# Patient Record
Sex: FEMALE | Race: WHITE | NOT HISPANIC OR LATINO | Employment: STUDENT | URBAN - METROPOLITAN AREA
[De-identification: names, ages, dates, MRNs, and addresses within clinical notes are randomized per-mention and may not be internally consistent; named-entity substitution may affect disease eponyms.]

---

## 2017-02-06 ENCOUNTER — OFFICE VISIT (OUTPATIENT)
Dept: ENDOCRINOLOGY | Facility: CLINIC | Age: 22
End: 2017-02-06

## 2017-02-06 VITALS
DIASTOLIC BLOOD PRESSURE: 72 MMHG | HEART RATE: 118 BPM | BODY MASS INDEX: 20.39 KG/M2 | WEIGHT: 108 LBS | HEIGHT: 61 IN | SYSTOLIC BLOOD PRESSURE: 110 MMHG

## 2017-02-06 DIAGNOSIS — E10.65 POORLY CONTROLLED TYPE 1 DIABETES MELLITUS (H): Primary | ICD-10-CM

## 2017-02-06 LAB — HBA1C MFR BLD: 12.1 % (ref 4.3–6)

## 2017-02-06 ASSESSMENT — PAIN SCALES - GENERAL: PAINLEVEL: NO PAIN (0)

## 2017-02-06 NOTE — PATIENT INSTRUCTIONS
Work on taking insulin every time that you eat.  Don't worry about testing when you eat- just cover the food.     Try these suggested doses for a few common foods...  Macaroni and cheese- 6-8 units  Hot pocket- 2 units  Cereal- 4 units  Popcorn- 2 units  Banana- 2 units  Grapes- 2 units    Test if you feel low.     A1c down from 13.1 to 12.1%!  Keep up the great work!    Aiyana   [phone]

## 2017-02-06 NOTE — Clinical Note
2/6/2017       RE: Ashley Gonzales  [address] [facility] [place] [address]     Dear Colleague,    Thank you for referring your patient, Ashley Gonzales, to the [facility] ENDOCRINOLOGY at [facility]. Please see a copy of my visit note below.    HPI: Ashley is a 22 yo woman here with her mom for follow up of type 1 diabetes, diagnosed at age 2.  She has struggled for many years with high blood sugars, but has recently been motivated to improve her control.  She struggles with her diabetes and the associated gastroparesis she has developed, although lately this has been somewhat better. She has gained a few pounds. She is followed by Dr. Leon here in GI.     Ashley's schedule is to go to sleep around 2 am and wake around 1-2 pm.  She usually eats her first meal around 2.  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  She eats throughout the day and does not usually cover with a bolus.  She has big struggles with the judgement she feels when she has high blood sugars.  She has never had severe hypoglycemia, but worries a little about it.  She feels when her blood sugars drop.  She wore a sensor in the past, but did not like being hooked to two devices.  She is actually quite interested in getting a dexcom sensor to help her with her management.     Ashley is currently testing her blood sugar once a day upon waking.  Over the past month, her overall average was 283 mg/dL (range 135-432).  She boluses about twice a day.  She started changing her infusion set more often.     Ashley wears a Medtronic paradigm revel pump with basal rates as follows:     Mid- 0.625  Noon- 0.7    IC ratio- 1:15g (although she does not usually count carbs)  Sensitivity- 50  Target glucose-   Active insulin time- 4 hours    Average total daily insulin dose- 28 Units ( 58%basal, 42%bolus)      ROS  GENERAL: weight stable.  No fevers,  chills, malaise, night sweats.   HEENT: no dysphagia, diplopia, neck pain or tenderness, dry/scratchy eyes, URI, cough, sinus drainage, tinnitus, sinus pressure  CV: no chest pain, pressure.  +palpitations occasionally.  Feels anxious.   LUNGS: no SOB, CARRASCO, cough, sputum production, wheezing   GI: no diarrhea, constipation, abdominal pain  EXTREMITIES: no rashes, ulcers, edema  NEUROLOGY: no changes in vision, tingling or numbness in hands or feet.   MSK: no muscle aches or pains, weakness  PSYCH: feels anxious, particularly when having to deal with her diabetes.    Past Medical History   Diagnosis Date     Diabetes (H)      Diagnosed at 18 months old, currently with insulin pump       No past surgical history on file.    Family History   Problem Relation Age of Onset     DIABETES Mother      Hyperlipidemia Mother      MENTAL ILLNESS Mother      DIABETES Father      MENTAL ILLNESS Father      DIABETES Maternal Grandfather      Hypertension Maternal Grandfather      Hyperlipidemia Maternal Grandfather      Breast Cancer Paternal Grandmother      CEREBROVASCULAR DISEASE Paternal Grandfather      Family History:  Mother-Type 2 DM  Father-Type 2 DM  Great grand aunt-DM type 2  Uncles with Type 2 DM   No thyroid disease or cancers.     Social History     Social History     Marital Status: Single     Spouse Name: N/A     Number of Children: N/A     Years of Education: N/A     Social History Main Topics     Smoking status: Never Smoker      Smokeless tobacco: None     Alcohol Use: No     Drug Use: No     Sexual Activity: Not Asked     Other Topics Concern     None     Social History Narrative   Social Hx: Ashley is single, lives at home with her parents and older brother.  She is very interested in photography and was enrolled at the [employer], but stopped going to classes recently due to her health issues.  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       Current  "Outpatient Prescriptions   Medication     blood glucose monitoring (MILA CONTOUR NEXT) test strip     Multiple vitamin  s TABS     blood glucose monitoring (NO BRAND SPECIFIED) test strip     insulin aspart (NOVOLOG) 100 UNITS/ML VIAL     ketone blood test STRP     Insulin Pump Accessories (SNAP INSULIN PUMP CONTROLLER) MISC     glucagon 1 MG injection     No current facility-administered medications for this visit.          Allergies   Allergen Reactions     No Clinical Screening - See Comments Hives and Rash     straw       Physical Exam  /72 mmHg  Pulse 118  Ht 1.549 m (5' 1\")  Wt 48.988 kg (108 lb)  BMI 20.42 kg/m2  GENERAL:  Alert and oriented X3, NAD, well dressed, answering questions appropriately, appears stated age.  EXTREMITIES: no edema, +pulses, no rashes, no lesions    RESULTS    Lab Results   Component Value Date    A1C 12.2* 01/11/2016       TSH   Date Value Ref Range Status   10/31/2016 1.63 0.40 - 4.00 mU/L Final       CREATININE   Date Value Ref Range Status   10/31/2016 0.63 0.50 - 1.00 mg/dL Final       No results found for: POTASSIUM    No results found for: MICROALBUMIN    No results found for: ALT    No results for input(s): CHOL, HDL, LDL, TRIG, CHOLHDLRATIO in the last 14864 hours.        Assessment/Plan:     1.  Type 1 diabetes-  Overall, control is improving.  A1c is down from 13.1% to 12.1%.  We discussed making small and attainable goals.  She avoids testing mainly because she does not want to see that she is high and she has significant anxiety around this.  We talked about just covering her food, even if she does not test.  We made a list of common foods she eats and I suggested boluses.  She feels she would be able to bolus 4-5 times a day by doing this, as it takes away the judgement.  She feels this is an attainable goal.  Her current goal is to get her a1c down to 10%, which I think is a very reasonable goal to start.  Will work together slowly.  I will set her up to see " Danielle Ruiz to order a Dexcom CGM. She does realize she will need to test twice daily to calibrate this, but with bolusing more regularly, she shouldn't be too high. I anticipate we will need to lower her basal rates as she starts bolusing more regularly. Much encouragement given for coming back to see me, for testing a bit more and for being willing to make some changes. Instructions given to patient:   Work on taking insulin every time that you eat.  Don't worry about testing when you eat- just cover the food.     Try these suggested doses for a few common foods...  Macaroni and cheese (full box)- 6-8 units  Hot pocket- 2 units  Cereal- 4 units  Popcorn- 2 units  Banana- 2 units  Grapes- 2 units    Test if you feel low.     A1c down from 13.1 to 12.1%!  Keep up the great work!      2.  Risk factors-     Retinopathy:  No.  Had eye exam within last year.   Nephropathy:  BP well controlled. Heart rate a bit high.  TSH ok.  Will also check MA and creatinine.  Neuropathy: No.    Feet: OK, no ulcers.   Lipids: Need labs    3.  F/U in 4-6 weeks with me, in 6 months to establish care with Dr. Nadia Stern, sooner with concerns or hypoglycemia.     25/30 minute visit was spent counseling patient.     Aiyana Melendez PA-C, MPAS   [facility]  Department of Medicine  Division of Endocrinology and Diabetes

## 2017-02-06 NOTE — NURSING NOTE
Chief Complaint   Patient presents with     RECHECK     F/U TYPE I DM     Clary Cespedes, CMA  Endocrinology & Diabetes 3G    Capillary Fingerstick performed for an Hemoglobin A1C test

## 2017-02-06 NOTE — MR AVS SNAPSHOT
After Visit Summary   2/6/2017    Ashley Gonzales    MRN: [id number]           Patient Information     Date Of Birth          1995        Visit Information        Provider Department      2/6/2017 1:30 PM Aiyana Melendez PA-C [facility] Endocrinology        Today's Diagnoses     Poorly controlled type 1 diabetes mellitus (H)    -  1       Care Instructions    Work on taking insulin every time that you eat.  Don't worry about testing when you eat- just cover the food.     Try these suggested doses for a few common foods...  Macaroni and cheese- 6-8 units  Hot pocket- 2 units  Cereal- 4 units  Popcorn- 2 units  Banana- 2 units  Grapes- 2 units    Test if you feel low.     A1c down from 13.1 to 12.1%!  Keep up the great work!    Aiyana   [phone]        Follow-ups after your visit        Additional Services     DIABETES EDUCATOR REFERRAL       Schedule in diabetes education to order sensor.                  Follow-up notes from your care team     Return in about 6 weeks (around 3/20/2017).      Your next 10 appointments already scheduled     Mar 27, 2017  2:00 PM   (Arrive by 1:45 PM)   RETURN DIABETES with JOSE ANTONIO Seaman [facility] Endocrinology ([facility])    [address] [place] [address] [id number] [phone]            Mar 27, 2017  2:30 PM   (Arrive by 2:15 PM)   Office Visit with Danielle Ruiz RN   [facility] Diabetes ([facility])    [address] [place] [address] [id number] [phone]           Bring a current list of meds and any records pertaining to this visit.  For Physicals, please bring immunization records and any forms needing to be filled out.  Please arrive 10 minutes early to complete paperwork.            May 10, 2017  3:40 PM   (Arrive by 3:25 PM)   Return Visit with Patricia Beasley MD   [facility] Gastroenterology and IBD ([facility])    [facility]  "[address] [id number] [phone]              Who to contact     Please call your clinic at [phone] to:    Ask questions about your health    Make or cancel appointments    Discuss your medicines    Learn about your test results    Speak to your doctor   If you have compliments or concerns about an experience at your clinic, or if you wish to file a complaint, please contact [facility] Patient Relations at [phone] or email us at [email or web address]         Additional Information About Your Visit        [employer] Information     [employer] gives you secure access to your electronic health record. If you see a primary care provider, you can also send messages to your care team and make appointments. If you have questions, please call your primary care clinic.  If you do not have a primary care provider, please call [phone] and they will assist you.      [employer] is an electronic gateway that provides easy, online access to your medical records. With [employer], you can request a clinic appointment, read your test results, renew a prescription or communicate with your care team.     To access your existing account, please contact your [facility] Clinic or call [phone] for assistance.        Care EveryWhere ID     This is your Care EveryWhere ID. This could be used by other organizations to access your [place] medical records  [id number]        Your Vitals Were     Pulse Height BMI (Body Mass Index)             118 1.549 m (5' 1\") 20.42 kg/m2          Blood Pressure from Last 3 Encounters:   02/06/17 110/72   10/31/16 111/72   09/19/16 116/78    Weight from Last 3 Encounters:   02/06/17 48.988 kg (108 lb)   10/31/16 49.351 kg (108 lb 12.8 oz) (13.00 %*)   09/19/16 48.036 kg (105 lb 14.4 oz) (9.09 %*)     * Growth percentiles are based on CDC 2-20 Years data.              We Performed the Following     " DIABETES EDUCATOR REFERRAL        Primary Care Provider Office Phone # Fax #    Mamadou Martinez [phone]       [facility] [address]  [facility] [phone]        Thank you!     Thank you for choosing [facility]  for your care. Our goal is always to provide you with excellent care. Hearing back from our patients is one way we can continue to improve our services. Please take a few minutes to complete the written survey that you may receive in the mail after your visit with us. Thank you!             Your Updated Medication List - Protect others around you: Learn how to safely use, store and throw away your medicines at www.disposemymeds.org.          This list is accurate as of: 2/6/17  2:35 PM.  Always use your most recent med list.                   Brand Name Dispense Instructions for use    * blood glucose monitoring test strip    no brand specified     TEST 6-8 TIMES DAILY       * blood glucose monitoring test strip    MILA CONTOUR NEXT    200 each    by In Vitro route 2 times daily Use to test blood sugar 2 times daily or as directed.       glucagon 1 MG kit      INJECT SUBCUTANEOUSLY AS DIRECTED AS NEEDED       insulin aspart 100 UNITS/ML injection    NovoLOG         ketone blood test Strp      As directed.       Multiple vitamin  s Tabs      Take 1 tablet by mouth       SNAP INSULIN PUMP CONTROLLER Misc      As directed. Novalog:  Gives a base rate, boluses according to 1-3 times/day       * Notice:  This list has 2 medication(s) that are the same as other medications prescribed for you. Read the directions carefully, and ask your doctor or other care provider to review them with you.

## 2017-02-06 NOTE — PROGRESS NOTES
HPI: Ashley is a 20 yo woman here with her mom for follow up of type 1 diabetes, diagnosed at age 2.  She has struggled for many years with high blood sugars, but has recently been motivated to improve her control.  She struggles with her diabetes and the associated gastroparesis she has developed, although lately this has been somewhat better. She has gained a few pounds. She is followed by Dr. Leon here in GI.     Ashley's schedule is to go to sleep around 2 am and wake around 1-2 pm.  She usually eats her first meal around 2.  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  She eats throughout the day and does not usually cover with a bolus.  She has big struggles with the judgement she feels when she has high blood sugars.  She has never had severe hypoglycemia, but worries a little about it.  She feels when her blood sugars drop.  She wore a sensor in the past, but did not like being hooked to two devices.  She is actually quite interested in getting a dexcom sensor to help her with her management.     Ashley is currently testing her blood sugar once a day upon waking.  Over the past month, her overall average was 283 mg/dL (range 135-432).  She boluses about twice a day.  She started changing her infusion set more often.     Ashley wears a Medtronic paradigm revel pump with basal rates as follows:     Mid- 0.625  Noon- 0.7    IC ratio- 1:15g (although she does not usually count carbs)  Sensitivity- 50  Target glucose-   Active insulin time- 4 hours    Average total daily insulin dose- 28 Units ( 58%basal, 42%bolus)      ROS  GENERAL: weight stable.  No fevers, chills, malaise, night sweats.   HEENT: no dysphagia, diplopia, neck pain or tenderness, dry/scratchy eyes, URI, cough, sinus drainage, tinnitus, sinus pressure  CV: no chest pain, pressure.  +palpitations occasionally.  Feels anxious.   LUNGS: no SOB, CARRASCO, cough, sputum  production, wheezing   GI: no diarrhea, constipation, abdominal pain  EXTREMITIES: no rashes, ulcers, edema  NEUROLOGY: no changes in vision, tingling or numbness in hands or feet.   MSK: no muscle aches or pains, weakness  PSYCH: feels anxious, particularly when having to deal with her diabetes.    Past Medical History   Diagnosis Date     Diabetes (H)      Diagnosed at 18 months old, currently with insulin pump       No past surgical history on file.    Family History   Problem Relation Age of Onset     DIABETES Mother      Hyperlipidemia Mother      MENTAL ILLNESS Mother      DIABETES Father      MENTAL ILLNESS Father      DIABETES Maternal Grandfather      Hypertension Maternal Grandfather      Hyperlipidemia Maternal Grandfather      Breast Cancer Paternal Grandmother      CEREBROVASCULAR DISEASE Paternal Grandfather      Family History:  Mother-Type 2 DM  Father-Type 2 DM  Great grand aunt-DM type 2  Uncles with Type 2 DM   No thyroid disease or cancers.     Social History     Social History     Marital Status: Single     Spouse Name: N/A     Number of Children: N/A     Years of Education: N/A     Social History Main Topics     Smoking status: Never Smoker      Smokeless tobacco: None     Alcohol Use: No     Drug Use: No     Sexual Activity: Not Asked     Other Topics Concern     None     Social History Narrative   Social Hx: Ashley is single, lives at home with her parents and older brother.  She is very interested in photography and was enrolled at the [employer], but stopped going to classes recently due to her health issues.  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       Current Outpatient Prescriptions   Medication     blood glucose monitoring (MILA CONTOUR NEXT) test strip     Multiple vitamin  s TABS     blood glucose monitoring (NO BRAND SPECIFIED) test strip     insulin aspart (NOVOLOG) 100 UNITS/ML VIAL     ketone blood test STRP      "Insulin Pump Accessories (SNAP INSULIN PUMP CONTROLLER) MISC     glucagon 1 MG injection     No current facility-administered medications for this visit.          Allergies   Allergen Reactions     No Clinical Screening - See Comments Hives and Rash     straw       Physical Exam  /72 mmHg  Pulse 118  Ht 1.549 m (5' 1\")  Wt 48.988 kg (108 lb)  BMI 20.42 kg/m2  GENERAL:  Alert and oriented X3, NAD, well dressed, answering questions appropriately, appears stated age.  EXTREMITIES: no edema, +pulses, no rashes, no lesions    RESULTS    Lab Results   Component Value Date    A1C 12.2* 01/11/2016       TSH   Date Value Ref Range Status   10/31/2016 1.63 0.40 - 4.00 mU/L Final       CREATININE   Date Value Ref Range Status   10/31/2016 0.63 0.50 - 1.00 mg/dL Final       No results found for: POTASSIUM    No results found for: MICROALBUMIN    No results found for: ALT    No results for input(s): CHOL, HDL, LDL, TRIG, CHOLHDLRATIO in the last 20932 hours.        Assessment/Plan:     1.  Type 1 diabetes-  Overall, control is improving.  A1c is down from 13.1% to 12.1%.  We discussed making small and attainable goals.  She avoids testing mainly because she does not want to see that she is high and she has significant anxiety around this.  We talked about just covering her food, even if she does not test.  We made a list of common foods she eats and I suggested boluses.  She feels she would be able to bolus 4-5 times a day by doing this, as it takes away the judgement.  She feels this is an attainable goal.  Her current goal is to get her a1c down to 10%, which I think is a very reasonable goal to start.  Will work together slowly.  I will set her up to see Danielle Petersrow to order a Dexcom CGM. She does realize she will need to test twice daily to calibrate this, but with bolusing more regularly, she shouldn't be too high. I anticipate we will need to lower her basal rates as she starts bolusing more regularly. Much " encouragement given for coming back to see me, for testing a bit more and for being willing to make some changes. Instructions given to patient:   Work on taking insulin every time that you eat.  Don't worry about testing when you eat- just cover the food.     Try these suggested doses for a few common foods...  Macaroni and cheese (full box)- 6-8 units  Hot pocket- 2 units  Cereal- 4 units  Popcorn- 2 units  Banana- 2 units  Grapes- 2 units    Test if you feel low.     A1c down from 13.1 to 12.1%!  Keep up the great work!      2.  Risk factors-     Retinopathy:  No.  Had eye exam within last year.   Nephropathy:  BP well controlled. Heart rate a bit high.  TSH ok.  Will also check MA and creatinine.  Neuropathy: No.    Feet: OK, no ulcers.   Lipids: Need labs    3.  F/U in 4-6 weeks with me, in 6 months to establish care with Dr. Nadia Stern, sooner with concerns or hypoglycemia.     25/30 minute visit was spent counseling patient.     Aiyana Melendez PA-C, MPAS   [facility]  Department of Medicine  Division of Endocrinology and Diabetes

## 2017-03-27 ENCOUNTER — OFFICE VISIT (OUTPATIENT)
Dept: EDUCATION SERVICES | Facility: CLINIC | Age: 22
End: 2017-03-27

## 2017-03-27 ENCOUNTER — OFFICE VISIT (OUTPATIENT)
Dept: ENDOCRINOLOGY | Facility: CLINIC | Age: 22
End: 2017-03-27

## 2017-03-27 VITALS
SYSTOLIC BLOOD PRESSURE: 118 MMHG | HEART RATE: 114 BPM | DIASTOLIC BLOOD PRESSURE: 75 MMHG | BODY MASS INDEX: 20.79 KG/M2 | HEIGHT: 61 IN | WEIGHT: 110.1 LBS

## 2017-03-27 DIAGNOSIS — E10.65 POORLY CONTROLLED TYPE 1 DIABETES MELLITUS (H): Primary | ICD-10-CM

## 2017-03-27 DIAGNOSIS — E10.9 DIABETES MELLITUS TYPE 1 (H): Primary | ICD-10-CM

## 2017-03-27 ASSESSMENT — PAIN SCALES - GENERAL: PAINLEVEL: NO PAIN (0)

## 2017-03-27 NOTE — MR AVS SNAPSHOT
"              After Visit Summary   3/27/2017    Ashley Gonzales    MRN: [id number]           Patient Information     Date Of Birth          1995        Visit Information        Provider Department      3/27/2017 2:30 PM Danielle Ruiz RN M [facility] Diabetes        Care Instructions    INSTRUCTIONS FOR WEARING THE DEXCOM CONTINUOUS GLUCOSE MONITOR (CGM):      1. After 2-3 hours, will be prompted to input 2 blood sugar readings - take two different samples (from different fingers) one right after the other, and enter in the ENTER BG menu on the .  This is called calibrating the CGM.      2. Take at least 1 blood sugar reading every 12 hours to calibrate the .  Remember that you should NEVER base a decision to correct a high BG or treat a low BG on the CGM reading.  Always check a blood glucose (BG) reading first.     3.  Don't calibrate the  if you see up or down arrows.  It can make subsequent readings inaccurate.  Wait to ENTER BG until it is >70 and < 300.     4.  It's a good idea to calibrate the CGM before you go to bed so you are not awakened in the middle of the night to do this.     5. Record what you eat at meals and snacks with portion eaten. Record amount of carbohydrate grams in the DexCom  when you eat to help track response to food intake. Record exercise type and time. Record medications you take and the time they are taken.     6. When monitor reads that \"Sensor needs to be Changed,\" go into menu function and hit \"STOP\" (NOT shutdown)    5. If battery power is low (will see indicator on monitor), plug the  in to be charged.  It takes about an hour to fully charge.     7. Avoid taking Tylenol while wearing the DexCom, as it can cause inaccurate glucose readings for about 6 hours you take it.     7. Return all equipment and any food/exercise/medication logs [facility] first [facility] on April 4.  Put everything in the envelope provided " and give to one of the concierges on the first or third floor.     8. Follow-up appointment for review of sensor reports scheduled.          Follow-ups after your visit        Your next 10 appointments already scheduled     May 10, 2017  3:40 PM CDT   (Arrive by 3:25 PM)   Return Visit with Patricia Beasley MD   [facility] IBD ([facility] [place])    [address]  4th Floor  [facility] [address] [phone]              Who to contact     Please call your clinic at [phone] to:    Ask questions about your health    Make or cancel appointments    Discuss your medicines    Learn about your test results    Speak to your doctor   If you have compliments or concerns about an experience at your clinic, or if you wish to file a complaint, please contact [facility] Patient Relations at [phone] or email us at [email or web address]         Additional Information About Your Visit        Sykio[employer] Online Information     Fwd: [employer] gives you secure access to your electronic health record. If you see a primary care provider, you can also send messages to your care team and make appointments. If you have questions, please call your primary care clinic.  If you do not have a primary care provider, please call [phone] and they will assist you.      Jackrabbit is an electronic gateway that provides easy, online access to your medical records. With Jackrabbit, you can request a clinic appointment, read your test results, renew a prescription or communicate with your care team.     To access your existing account, please contact your [facility] or call [phone] for assistance.        Care EveryWhere ID     This is your Care EveryWhere ID. This could be used by other organizations to access your [place] medical records  [id number]         Blood Pressure from Last 3 Encounters:   03/27/17 118/75    02/06/17 110/72   10/31/16 111/72    Weight from Last 3 Encounters:   03/27/17 49.9 kg (110 lb 1.6 oz)   02/06/17 49 kg (108 lb)   10/31/16 49.4 kg (108 lb 12.8 oz) (13 %)*     * Growth percentiles are based on [facility] 2-20 Years data.              Today, you had the following     No orders found for display       Primary Care Provider Office Phone # Fax #    Mamadou Martinez [phone]       [facility] [address]  [facility] [address]        Thank you!     Thank you for choosing [facility] DIABETES  for your care. Our goal is always to provide you with excellent care. Hearing back from our patients is one way we can continue to improve our services. Please take a few minutes to complete the written survey that you may receive in the mail after your visit with us. Thank you!             Your Updated Medication List - Protect others around you: Learn how to safely use, store and throw away your medicines at www.disposemymeds.org.          This list is accurate as of: 3/27/17  3:29 PM.  Always use your most recent med list.                   Brand Name Dispense Instructions for use    * blood glucose monitoring test strip    no brand specified     TEST 6-8 TIMES DAILY       * blood glucose monitoring test strip    MILA CONTOUR NEXT    200 each    by In Vitro route 2 times daily Use to test blood sugar 2 times daily or as directed.       glucagon 1 MG kit      INJECT SUBCUTANEOUSLY AS DIRECTED AS NEEDED       insulin aspart 100 UNITS/ML injection    NovoLOG         ketone blood test Strp      As directed.       Multiple vitamin  s Tabs      Take 1 tablet by mouth       SNAP INSULIN PUMP CONTROLLER Misc      As directed. Novalog:  Gives a base rate, boluses according to 1-3 times/day       * Notice:  This list has 2 medication(s) that are the same as other medications prescribed for you. Read the directions carefully, and ask your doctor or other care provider to review them with  you.

## 2017-03-27 NOTE — PATIENT INSTRUCTIONS
Focus on covering food whenever you eat (even if you do not test!).      Wear diagnostic sensor x 1 week    Keep up the great work!!

## 2017-03-27 NOTE — LETTER
3/27/2017     RE: Ashley Gonzales  [address] [facility] [address]     Dear Colleague,    Thank you for referring your patient, Ashley Gonzales, to the [facility] ENDOCRINOLOGY at [facility]. Please see a copy of my visit note below.    HPI:   Ashley is a 22 yo woman here with her mom for follow up of type 1 diabetes, diagnosed at age 2.  This has been poorly controlled for many years.  She is very excited to get the Dexcom sensor ordered and has been working hard at testing twice daily to do so.  Ashley is currently testing her blood sugar twice a day upon waking and after returning from gymnastics.  Over the past month, her overall average was 283 mg/dL (range ).  She boluses about 2-3 times a day.  She started changing her infusion set more often.     Ashley wears a Medtronic paradigm revel pump with basal rates as follows:     Mid- 0.625  Noon- 0.7    IC ratio- 1:15g (although she does not usually count carbs)  Sensitivity- 50  Target glucose-   Active insulin time- 4 hours    Average total daily insulin dose- 30 Units ( 54%basal, 46%bolus)      ROS  GENERAL: weight stable.  No fevers, chills, malaise, night sweats.   HEENT: no dysphagia, diplopia, neck pain or tenderness, dry/scratchy eyes, URI, cough, sinus drainage, tinnitus, sinus pressure  CV: no chest pain, pressure.  +palpitations occasionally.  Feels anxious.   LUNGS: no SOB, CARRASCO, cough, sputum production, wheezing   GI: no diarrhea, constipation, abdominal pain  EXTREMITIES: no rashes, ulcers, edema  NEUROLOGY: no changes in vision, tingling or numbness in hands or feet.   MSK: no muscle aches or pains, weakness  PSYCH: anxiety a bit better    Past Medical History:   Diagnosis Date     Diabetes (H)     Diagnosed at 18 months old, currently with insulin pump       No past surgical history on file.    Family History   Problem Relation Age of Onset     DIABETES Mother      Hyperlipidemia Mother   "    MENTAL ILLNESS Mother      DIABETES Father      MENTAL ILLNESS Father      DIABETES Maternal Grandfather      Hypertension Maternal Grandfather      Hyperlipidemia Maternal Grandfather      Breast Cancer Paternal Grandmother      CEREBROVASCULAR DISEASE Paternal Grandfather      Family History:  Mother-Type 2 DM  Father-Type 2 DM  Great grand aunt-DM type 2  Uncles with Type 2 DM   No thyroid disease or cancers.     Social History     Social History     Marital Status: Single     Spouse Name: N/A     Number of Children: N/A     Years of Education: N/A     Social History Main Topics     Smoking status: Never Smoker      Smokeless tobacco: None     Alcohol Use: No     Drug Use: No     Sexual Activity: Not Asked     Other Topics Concern     None     Social History Narrative   Social Hx: Ashley is single, lives at home with her parents and older brother.  She is very interested in photography and was enrolled at the [employer], but stopped going to classes recently due to her health issues.  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       Current Outpatient Prescriptions   Medication     blood glucose monitoring (MILA CONTOUR NEXT) test strip     Multiple vitamin  s TABS     blood glucose monitoring (NO BRAND SPECIFIED) test strip     insulin aspart (NOVOLOG) 100 UNITS/ML VIAL     ketone blood test STRP     Insulin Pump Accessories (SNAP INSULIN PUMP CONTROLLER) MISC     glucagon 1 MG injection     No current facility-administered medications for this visit.           Allergies   Allergen Reactions     No Clinical Screening - See Comments Hives and Rash     straw       Physical Exam  /75  Pulse 114  Ht 1.549 m (5' 1\")  Wt 49.9 kg (110 lb 1.6 oz)  BMI 20.8 kg/m2  GENERAL:  Alert and oriented X3, NAD, well dressed, answering questions appropriately, appears stated age.  EXTREMITIES: no edema, +pulses, no rashes, no lesions    RESULTS    Lab Results "   Component Value Date    A1C 12.2 (A) 01/11/2016       TSH   Date Value Ref Range Status   10/31/2016 1.63 0.40 - 4.00 mU/L Final       Creatinine   Date Value Ref Range Status   10/31/2016 0.63 0.50 - 1.00 mg/dL Final     No results found for: POTASSIUM    No results found for: MICROALBUMIN    No results found for: ALT    No results for input(s): CHOL, HDL, LDL, TRIG, CHOLHDLRATIO in the last 22653 hours.    Assessment/Plan:     1.  Type 1 diabetes-  Overall, control is improving.  She will focus again on just covering the food she eats, even if she is not testing.  She will set up a diagnostic sensor today and she will be ordering one today.  Much encouragement given for coming back to see me, for testing a bit more and for being willing to make some changes.     Will increase overnight basal rate as follows:   Mid- 0.675 (up from 0.625)  Noon- 0.7 (no change)    Anticipate we may need to tighten IC ratio in the future, but for now, the main focus is just covering whatever she eats.      2.  Risk factors-     Retinopathy:  No.  Had eye exam within last year.   Nephropathy:  BP well controlled. Heart rate a bit high.  TSH ok.  Will also check MA and creatinine.  Neuropathy: No.    Feet: OK, no ulcers.   Lipids: Need labs    3.  F/U in 4-6 weeks with me, in 6 months to establish care with Dr. Nadia Stern, sooner with concerns or hypoglycemia.     25/30 minute visit was spent counseling patient.     Aiyana Melendez PA-C, MPAS   [facility]  Department of Medicine  Division of Endocrinology and Diabetes

## 2017-03-27 NOTE — PROGRESS NOTES
HPI:   Ashley is a 20 yo woman here with her mom for follow up of type 1 diabetes, diagnosed at age 2.  This has been poorly controlled for many years.  She is very excited to get the Dexcom sensor ordered and has been working hard at testing twice daily to do so.  Ashley is currently testing her blood sugar twice a day upon waking and after returning from gymnastics.  Over the past month, her overall average was 283 mg/dL (range ).  She boluses about 2-3 times a day.  She started changing her infusion set more often.     Ashley wears a Medtronic paradigm revel pump with basal rates as follows:     Mid- 0.625  Noon- 0.7    IC ratio- 1:15g (although she does not usually count carbs)  Sensitivity- 50  Target glucose-   Active insulin time- 4 hours    Average total daily insulin dose- 30 Units ( 54%basal, 46%bolus)      ROS  GENERAL: weight stable.  No fevers, chills, malaise, night sweats.   HEENT: no dysphagia, diplopia, neck pain or tenderness, dry/scratchy eyes, URI, cough, sinus drainage, tinnitus, sinus pressure  CV: no chest pain, pressure.  +palpitations occasionally.  Feels anxious.   LUNGS: no SOB, CARRASCO, cough, sputum production, wheezing   GI: no diarrhea, constipation, abdominal pain  EXTREMITIES: no rashes, ulcers, edema  NEUROLOGY: no changes in vision, tingling or numbness in hands or feet.   MSK: no muscle aches or pains, weakness  PSYCH: anxiety a bit better    Past Medical History:   Diagnosis Date     Diabetes (H)     Diagnosed at 18 months old, currently with insulin pump       No past surgical history on file.    Family History   Problem Relation Age of Onset     DIABETES Mother      Hyperlipidemia Mother      MENTAL ILLNESS Mother      DIABETES Father      MENTAL ILLNESS Father      DIABETES Maternal Grandfather      Hypertension Maternal Grandfather      Hyperlipidemia Maternal Grandfather      Breast Cancer Paternal Grandmother      CEREBROVASCULAR DISEASE Paternal  "Grandfather      Family History:  Mother-Type 2 DM  Father-Type 2 DM  Great grand aunt-DM type 2  Uncles with Type 2 DM   No thyroid disease or cancers.     Social History     Social History     Marital Status: Single     Spouse Name: N/A     Number of Children: N/A     Years of Education: N/A     Social History Main Topics     Smoking status: Never Smoker      Smokeless tobacco: None     Alcohol Use: No     Drug Use: No     Sexual Activity: Not Asked     Other Topics Concern     None     Social History Narrative   Social Hx: Ashley is single, lives at home with her parents and older brother.  She is very interested in photography and was enrolled at the [employer], but stopped going to classes recently due to her health issues.  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       Current Outpatient Prescriptions   Medication     blood glucose monitoring (MILA CONTOUR NEXT) test strip     Multiple vitamin  s TABS     blood glucose monitoring (NO BRAND SPECIFIED) test strip     insulin aspart (NOVOLOG) 100 UNITS/ML VIAL     ketone blood test STRP     Insulin Pump Accessories (SNAP INSULIN PUMP CONTROLLER) MISC     glucagon 1 MG injection     No current facility-administered medications for this visit.           Allergies   Allergen Reactions     No Clinical Screening - See Comments Hives and Rash     straw       Physical Exam  /75  Pulse 114  Ht 1.549 m (5' 1\")  Wt 49.9 kg (110 lb 1.6 oz)  BMI 20.8 kg/m2  GENERAL:  Alert and oriented X3, NAD, well dressed, answering questions appropriately, appears stated age.  EXTREMITIES: no edema, +pulses, no rashes, no lesions    RESULTS    Lab Results   Component Value Date    A1C 12.2 (A) 01/11/2016       TSH   Date Value Ref Range Status   10/31/2016 1.63 0.40 - 4.00 mU/L Final       Creatinine   Date Value Ref Range Status   10/31/2016 0.63 0.50 - 1.00 mg/dL Final       No results found for: POTASSIUM    No " results found for: MICROALBUMIN    No results found for: ALT    No results for input(s): CHOL, HDL, LDL, TRIG, CHOLHDLRATIO in the last 56560 hours.    Assessment/Plan:     1.  Type 1 diabetes-  Overall, control is improving.  She will focus again on just covering the food she eats, even if she is not testing.  She will set up a diagnostic sensor today and she will be ordering one today.  Much encouragement given for coming back to see me, for testing a bit more and for being willing to make some changes.     Will increase overnight basal rate as follows:   Mid- 0.675 (up from 0.625)  Noon- 0.7 (no change)    Anticipate we may need to tighten IC ratio in the future, but for now, the main focus is just covering whatever she eats.      2.  Risk factors-     Retinopathy:  No.  Had eye exam within last year.   Nephropathy:  BP well controlled. Heart rate a bit high.  TSH ok.  Will also check MA and creatinine.  Neuropathy: No.    Feet: OK, no ulcers.   Lipids: Need labs    3.  F/U in 4-6 weeks with me, in 6 months to establish care with Dr. Nadia Stern, sooner with concerns or hypoglycemia.     25/30 minute visit was spent counseling patient.     Aiyana Melendez PA-C, MPAS   [facility]  Department of Medicine  Division of Endocrinology and Diabetes

## 2017-03-27 NOTE — MR AVS SNAPSHOT
After Visit Summary   3/27/2017    Ashley Gonzales    MRN: [id number]           Patient Information     Date Of Birth          1995        Visit Information        Provider Department      3/27/2017 2:00 PM Aiyana Melendez PA-C [facility] Endocrinology        Today's Diagnoses     Poorly controlled type 1 diabetes mellitus (H)    -  1      Care Instructions    Focus on covering food whenever you eat (even if you do not test!).      Wear diagnostic sensor x 1 week    Keep up the great work!!            Follow-ups after your visit        Your next 10 appointments already scheduled     May 10, 2017  3:40 PM CDT   (Arrive by 3:25 PM)   Return Visit with Patricia Beasley MD   [facility] IBD ([facility])    [address] [address] [phone]              Who to contact     Please call your clinic at [phone] to:    Ask questions about your health    Make or cancel appointments    Discuss your medicines    Learn about your test results    Speak to your doctor   If you have compliments or concerns about an experience at your clinic, or if you wish to file a complaint, please contact [facility] Patient Relations at [phone] or email us at [email or web address]         Additional Information About Your Visit        MyChart Information     [employer] gives you secure access to your electronic health record. If you see a primary care provider, you can also send messages to your care team and make appointments. If you have questions, please call your primary care clinic.  If you do not have a primary care provider, please call [phone] and they will assist you.      [employer] is an electronic gateway that provides easy, online access to your medical records. With [employer], you can request a clinic appointment, read your test results, renew a prescription or communicate with  "your care team.     To access your existing account, please contact your [facility] or call [phone] for assistance.        Care EveryWhere ID     This is your Care EveryWhere ID. This could be used by other organizations to access your [place] medical records  [id number]        Your Vitals Were     Pulse Height BMI (Body Mass Index)             114 1.549 m (5' 1\") 20.8 kg/m2          Blood Pressure from Last 3 Encounters:   03/27/17 118/75   02/06/17 110/72   10/31/16 111/72    Weight from Last 3 Encounters:   03/27/17 49.9 kg (110 lb 1.6 oz)   02/06/17 49 kg (108 lb)   10/31/16 49.4 kg (108 lb 12.8 oz) (13 %)*     * Growth percentiles are based on [facility] 2-20 Years data.              Today, you had the following     No orders found for display       Primary Care Provider Office Phone # Fax #    Mamadou Martinez [phone]       [place] [address] [place] [facility] [address]        Thank you!     Thank you for choosing [facility] ENDOCRINOLOGY  for your care. Our goal is always to provide you with excellent care. Hearing back from our patients is one way we can continue to improve our services. Please take a few minutes to complete the written survey that you may receive in the mail after your visit with us. Thank you!             Your Updated Medication List - Protect others around you: Learn how to safely use, store and throw away your medicines at www.disposemymeds.org.          This list is accurate as of: 3/27/17  3:01 PM.  Always use your most recent med list.                   Brand Name Dispense Instructions for use    * blood glucose monitoring test strip    no brand specified     TEST 6-8 TIMES DAILY       * blood glucose monitoring test strip    MILA CONTOUR NEXT    200 each    by In Vitro route 2 times daily Use to test blood sugar 2 times daily or as directed.       glucagon 1 MG kit      INJECT SUBCUTANEOUSLY AS DIRECTED AS NEEDED "       insulin aspart 100 UNITS/ML injection    NovoLOG         ketone blood test Strp      As directed.       Multiple vitamin  s Tabs      Take 1 tablet by mouth       SNAP INSULIN PUMP CONTROLLER Misc      As directed. Novalog:  Gives a base rate, boluses according to 1-3 times/day       * Notice:  This list has 2 medication(s) that are the same as other medications prescribed for you. Read the directions carefully, and ask your doctor or other care provider to review them with you.

## 2017-03-28 NOTE — PROGRESS NOTES
DIABETES EDUCATION NOTE:    Ashley Gonzales presents today for education related to  Type 1 diabetes  Patient is being treated with:  insulin pump  She is accompanied by mother    Referring Provider: Aiyana Melendez PA-C      PATIENT CONCERNS/REASON FOR CGM PLACEMENT:    Frequent hypoglycemia, To determine overnight glucose control and Optimize insulin pump settings   She is considering getting a Dexcom CGM for personal use and would like to try it out to see how it works.     ASSESSMENT:    Current Diabetes Management:  Insulin pump settings were reviewed as well as blood glucose results by Ms. Melendez today.        Lab Results:  Lab Results   Component Value Date    A1C 12.2 01/11/2016     No results found for: GLC    Vitals:  There were no vitals taken for this visit.    Dexcom sensor started today. Dexcom was inserted with no resistance or bleeding at insertion site.    Pt will plan to wear the sensor for seven days.    WRITTEN AND VERBAL INSTRUCTION GIVEN:   Purpose and procedure for placing sensor explained and Ashley Gonzales verbalized understanding.  Sensor Lot Number: [id number]  Sensor Expiration Date: 01/04/2018  Transmitter ID:  [id number]   SN: [id number]    Explanation of difference between blood glucose and sensor glucose.  Insertion of sensor, technique, angle, site rotation, skin prep use, frequency of change.  Instructed to avoid taking any acetaminophen containing products while wearing the sensor, and if unavoidable, made aware that readings that will be completely unreliable for 4 to 6 hours afterward.   Cautioned that no decisions about treatment of hypo- or hyperglycemia can be based on a sensor reading, but must be double checked with a blood glucose.   Programming settings:  Hi and low alerts, rate alerts, predictive alerts,  out-of-range alerts and fixed low alert of 55 mg/mL.   Calibration requirements:  A minimum of one BG calibration every 12 hours is needed for sensor readings to be  displayed.   Instructed to never use the CGM reading displayed to calibrate the CGM.   Basic Care of transmitter and :      Patient's role in data gathering explained, including:    Need for careful food records, which include time foods eaten, carbohydrate content, insulin doses and times, and any contributing factors like stress and exercise.    Comments for any episodes of hypoglycemia    Process for removing sensor and returning to diabetes education center.      Contact information provided in case of questions or problems.      Need to check BG before each meal and at bedtime and record on food records    Provided with food records and sensor agreement signed.  IV 3000 over-tape provided if more tape necessary for adhesion.  Patient verbalizes understanding of how to remove sensor and all instructions provided.     Educational and other materials:  Food/exercise/medication log sheets  Dexcom Quickstart Guide  Contact information  Return Check List    PLAN:    See Patient Instructions, AVS printed and provided to patient today.    Follow-up:    Patient to return all items associated with professional Continuous Glucose Monitor System in person by bringing materials to the 3rd floor  at the [facility] on return date. .  Return date: April 3, 2017    Plan to follow up with patient on CGM results:  Report to Aiyana Melendez PA-C.   Ashley will contact me if she wants to move forward with getting a Dexcom of her own.     Time Spent: 30 minutes  Encounter Type: Individual

## 2017-04-12 ENCOUNTER — TELEPHONE (OUTPATIENT)
Dept: ENDOCRINOLOGY | Facility: CLINIC | Age: 22
End: 2017-04-12

## 2017-04-12 NOTE — TELEPHONE ENCOUNTER
I called Ashley to review her Dexcom report.  She overall liked wearing the sensor, but got a little annoyed with the high alarms.      Sensor review:   Patient wore a Dexcom sensor for 7 days.   Overall average glucose:  236 mg/dL  Undetected hypoglycemia: No  Nocturnal hypoglycemia: No  Patterns: blood sugars climb from 10 am onward.  Highest at night.  Blood sugars come down nicely with correction.      Overall, she did extremely well with the sensor and found that she watched things closely.  She would like to proceed with getting her own personal sensor.      In the interim, I made the following changes to her basal rates:   Mid- 0.675 (no change)  9am- 0.725 (up from 0.7 and 3 hours earlier    She will focus on covering all the carbs she is eating for now.  If numbers remain high despite this, may need to tighten IC ratio.      Much encouragement given for paying close attention to her diabetes and making quite a big improvement.      At the end of our conversation, she did mention that she was hospitalized in DKA last Friday near her home.  She suddenly became very sick.  Her white count was elevated.  No pump delivery problems.  She was unsure of the cause.  She is feeling better now.

## 2017-04-17 ENCOUNTER — MEDICAL CORRESPONDENCE (OUTPATIENT)
Dept: HEALTH INFORMATION MANAGEMENT | Facility: CLINIC | Age: 22
End: 2017-04-17

## 2017-05-03 ENCOUNTER — TELEPHONE (OUTPATIENT)
Dept: GASTROENTEROLOGY | Facility: CLINIC | Age: 22
End: 2017-05-03

## 2017-05-08 ENCOUNTER — OFFICE VISIT (OUTPATIENT)
Dept: ENDOCRINOLOGY | Facility: CLINIC | Age: 22
End: 2017-05-08

## 2017-05-08 VITALS
DIASTOLIC BLOOD PRESSURE: 73 MMHG | SYSTOLIC BLOOD PRESSURE: 107 MMHG | BODY MASS INDEX: 20.75 KG/M2 | HEIGHT: 61 IN | HEART RATE: 94 BPM | WEIGHT: 109.9 LBS

## 2017-05-08 DIAGNOSIS — E10.65 POORLY CONTROLLED TYPE 1 DIABETES MELLITUS (H): Primary | ICD-10-CM

## 2017-05-08 LAB — HBA1C MFR BLD: 10.7 % (ref 4.3–6)

## 2017-05-08 RX ORDER — ESCITALOPRAM OXALATE 20 MG/1
20 TABLET ORAL
COMMUNITY
Start: 2017-04-10

## 2017-05-08 RX ORDER — ONDANSETRON 4 MG/1
4 TABLET, ORALLY DISINTEGRATING ORAL PRN
COMMUNITY
Start: 2016-03-04

## 2017-05-08 ASSESSMENT — PAIN SCALES - GENERAL: PAINLEVEL: NO PAIN (0)

## 2017-05-08 NOTE — MR AVS SNAPSHOT
After Visit Summary   5/8/2017    Ashley Gonzales    MRN: [id number]           Patient Information     Date Of Birth          1995        Visit Information        Provider Department      5/8/2017 12:30 PM Aiyana Melendez PA-C [facility]        Care Instructions    New basal rates:   Mid- 0.725 (up from 0.675)  9am- 0.775 (0.725)    Focus on covering whatever you eat (i.e. Try taking just 2 units for a snack)    I will talk with Danielle about sensor ordering.      A1c down from 13.1% to 10.7%!!!    Keep up the great work!    Aiyana         Follow-ups after your visit        Follow-up notes from your care team     Return in about 6 weeks (around 6/19/2017).      Your next 10 appointments already scheduled     May 10, 2017  3:40 PM CDT   (Arrive by 3:25 PM)   Return Visit with Patricia Beasley MD   [facility] ([facility])    [address] [place] [address] [id number] [phone]            Jun 26, 2017 12:00 PM CDT   (Arrive by 11:45 AM)   RETURN DIABETES with JOSE ANTONIO Seaman [facility] ([facility])    [address] [place] [address] [id number] [phone]            Nov 13, 2017  3:00 PM CST   (Arrive by 2:45 PM)   NEW DIABETES with Nadia Stern MD   [facility] ([facility])    [address] [place] [address] [id number] [phone]              Who to contact     Please call your clinic at [phone] to:    Ask questions about your health    Make or cancel appointments    Discuss your medicines    Learn about your test results    Speak to your doctor   If you have compliments or concerns about an experience at your clinic, or if you wish to file a complaint, please contact [facility] Patient Relations at [phone] or email us at  [email or web address]         Additional Information About Your Visit        [employer] Information     [employer] gives you secure access to your electronic health record. If you see a primary care provider, you can also send messages to your care team and make appointments. If you have questions, please call your primary care clinic.  If you do not have a primary care provider, please call [phone] and they will assist you.      [employer] is an electronic gateway that provides easy, online access to your medical records. With [employer], you can request a clinic appointment, read your test results, renew a prescription or communicate with your care team.     To access your existing account, please contact your [facility] or call [phone] for assistance.        Care EveryWhere ID     This is your Care EveryWhere ID. This could be used by other organizations to access your [place] medical records  [id number]        Your Vitals Were     Pulse Height BMI (Body Mass Index)             94 1.549 m (5' 1\") 20.77 kg/m2          Blood Pressure from Last 3 Encounters:   05/08/17 107/73   03/27/17 118/75   02/06/17 110/72    Weight from Last 3 Encounters:   05/08/17 49.9 kg (109 lb 14.4 oz)   03/27/17 49.9 kg (110 lb 1.6 oz)   02/06/17 49 kg (108 lb)              Today, you had the following     No orders found for display       Primary Care Provider Office Phone # Fax #    Mamadou Martinez [phone]       [facility] [address]  [facility] [id number]        Thank you!     Thank you for choosing [facility]  for your care. Our goal is always to provide you with excellent care. Hearing back from our patients is one way we can continue to improve our services. Please take a few minutes to complete the written survey that you may receive in the mail after your visit with us. Thank you!             Your Updated Medication List - Protect " others around you: Learn how to safely use, store and throw away your medicines at www.disposemymeds.org.          This list is accurate as of: 5/8/17  1:30 PM.  Always use your most recent med list.                   Brand Name Dispense Instructions for use    * blood glucose monitoring test strip    no brand specified     TEST 6-8 TIMES DAILY       * blood glucose monitoring test strip    MILA CONTOUR NEXT    200 each    by In Vitro route 2 times daily Use to test blood sugar 2 times daily or as directed.       escitalopram 20 MG tablet    LEXAPRO     Take 20 mg by mouth       glucagon 1 MG kit      INJECT SUBCUTANEOUSLY AS DIRECTED AS NEEDED       insulin aspart 100 UNITS/ML injection    NovoLOG         ketone blood test Strp      As directed.       Multiple vitamin  s Tabs      Take 1 tablet by mouth       ondansetron 4 MG ODT tab    ZOFRAN-ODT     Place 4 mg under the tongue as needed       SNAP INSULIN PUMP CONTROLLER Misc      As directed. Novalog:  Gives a base rate, boluses according to 1-3 times/day       * Notice:  This list has 2 medication(s) that are the same as other medications prescribed for you. Read the directions carefully, and ask your doctor or other care provider to review them with you.

## 2017-05-08 NOTE — LETTER
5/8/2017     RE: Ashley Gonzales  [address] [facility] [address]     Dear Colleague,    Thank you for referring your patient, Ashley Gonzales, to the [facility] ENDOCR[facility] at [facility]. Please see a copy of my visit note below.    HPI:   Ashley is a 22 yo woman here with her mom for follow up of type 1 diabetes, diagnosed at age 2.  This has been poorly controlled for many years.  She has been feeling more motivated to take care of her diabetes and feels like she is making some progress.  She is still a bit fearful of seeing the high numbers, so is reluctant to test, but is encouraged to see more readings in the 100's.  She did have an unexplained episode of DKA last month (while wearing the diagnostic sensor).  She had not missed insulin, but had been running in the upper 200-300 range.  She was kept overnight, but did not have to go in the ICU.  She had an elevated white count, but never found a source for infection.   She liked wearing the sensor and did quite well in calibrating it and paying attention to it.  Ashley is currently testing her blood sugar twice a day upon waking and after returning from gymnastics.  Over the past month, her overall average was 287 mg/dL (range 156-537).  She boluses about 2-3 times a day.  She started changing her infusion set more often.     Ashley wears a Medtronic paradigm revel pump with basal rates as follows:     Mid- 0.675  Noon- 0.725    IC ratio- 1:15g (although she does not usually count carbs)  Sensitivity- 50  Target glucose-   Active insulin time- 4 hours    Average total daily insulin dose- 32 Units ( 54%basal, 46%bolus)    She will be seeing GI (Dr. Beasley) to establish care for her gastroparesis in a few days.  GI symptoms have been pretty good lately.       ROS  GENERAL: weight stable.  No fevers, chills, malaise, night sweats.   HEENT: no dysphagia, diplopia, neck pain or tenderness, dry/scratchy eyes,  URI, cough, sinus drainage, tinnitus, sinus pressure  CV: no chest pain, pressure.  +palpitations occasionally.  Feels anxious.   LUNGS: no SOB, CARRASCO, cough, sputum production, wheezing   GI: no diarrhea, constipation, abdominal pain  EXTREMITIES: no rashes, ulcers, edema  NEUROLOGY: no changes in vision, tingling or numbness in hands or feet.   MSK: no muscle aches or pains, weakness  PSYCH: anxiety a bit better    Past Medical History:   Diagnosis Date     Diabetes (H)     Diagnosed at 18 months old, currently with insulin pump       No past surgical history on file.    Family History   Problem Relation Age of Onset     DIABETES Mother      Hyperlipidemia Mother      MENTAL ILLNESS Mother      DIABETES Father      MENTAL ILLNESS Father      DIABETES Maternal Grandfather      Hypertension Maternal Grandfather      Hyperlipidemia Maternal Grandfather      Breast Cancer Paternal Grandmother      CEREBROVASCULAR DISEASE Paternal Grandfather      Family History:  Mother-Type 2 DM  Father-Type 2 DM  Great grand aunt-DM type 2  Uncles with Type 2 DM   No thyroid disease or cancers.     Social History     Social History     Marital Status: Single     Spouse Name: N/A     Number of Children: N/A     Years of Education: N/A     Social History Main Topics     Smoking status: Never Smoker      Smokeless tobacco: None     Alcohol Use: No     Drug Use: No     Sexual Activity: Not Asked     Other Topics Concern     None     Social History Narrative   Social Hx: Ashley is single, lives at home with her parents and older brother.  She is very interested in photography and was enrolled at the [employer], but stopped going to classes recently due to her health issues.  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       Current Outpatient Prescriptions   Medication     escitalopram (LEXAPRO) 20 MG tablet     ondansetron (ZOFRAN-ODT) 4 MG ODT tab     blood glucose monitoring (MILA  "CONTOUR NEXT) test strip     Multiple vitamin  s TABS     blood glucose monitoring (NO BRAND SPECIFIED) test strip     insulin aspart (NOVOLOG) 100 UNITS/ML VIAL     ketone blood test STRP     Insulin Pump Accessories (SNAP INSULIN PUMP CONTROLLER) MISC     glucagon 1 MG injection     No current facility-administered medications for this visit.           Allergies   Allergen Reactions     No Clinical Screening - See Comments Hives and Rash     straw       Physical Exam  /73 (BP Location: Right arm, Patient Position: Chair, Cuff Size: Adult Regular)  Pulse 94  Ht 1.549 m (5' 1\")  Wt 49.9 kg (109 lb 14.4 oz)  BMI 20.77 kg/m2  GENERAL:  Alert and oriented X3, NAD, well dressed, answering questions appropriately, appears stated age.  EXTREMITIES: no edema, +pulses, no rashes, no lesions  RESULTS  Lab Results   Component Value Date    A1C 12.2 01/11/2016       TSH   Date Value Ref Range Status   10/31/2016 1.63 0.40 - 4.00 mU/L Final       Creatinine   Date Value Ref Range Status   10/31/2016 0.63 0.50 - 1.00 mg/dL Final   ]    No results found for: ALBUMIN]    No results found for: ALT]    No results for input(s): CHOL, HDL, LDL, TRIG, CHOLHDLRATIO in the last 88697 hours.    Assessment/Plan:     1.  Type 1 diabetes-  Overall, control is improving.  A1c has come down from 13.1% to 10.9% today.  She is making slow, steady progress and slowly making the changes she needs to maintain good control in the long term.  She did quite well on the Dexcom sensor, so will plan to order this. I emphasized the importance of testing her sugars at least once a day for safety (DKA prevention).   For now, she will focus on this, as well as focus on taking a bolus whenever she eats a meal or snack.  Her DKA episode was likely a insulin delivery problem with her pump. Much encouragement given for coming back to see me, for testing a bit more and for being willing to make some changes.     Will increase overnight basal rate as " follows:   Mid- 0.725(up from 0.675)  9am- 0.775 (up from 0.725)    Anticipate we may need to tighten IC ratio in the future, but for now, the main focus is just covering whatever she eats.      2.  Risk factors-     Retinopathy:  No.  Had eye exam within last year.   Nephropathy:  BP well controlled. Heart rate a bit high, but lower than in the past.  TSH ok.  No microalbuminuria.  Creatinine is stable.   Neuropathy: No.    Feet: OK, no ulcers.   Lipids: Need labs    3.  F/U in 6 weeks with me, in 6 months to establish care with Dr. Nadia Stern, sooner with concerns or hypoglycemia.     25/30 minute visit was spent counseling patient.     Aiyana Melendez PA-C, MPAS   [facility]  Department of Medicine  Division of Endocrinology and Diabetes

## 2017-05-08 NOTE — PATIENT INSTRUCTIONS
New basal rates:   Mid- 0.725 (up from 0.675)  9am- 0.775 (0.725)    Focus on covering whatever you eat (i.e. Try taking just 2 units for a snack)    I will talk with Danielle about sensor ordering.      A1c down from 13.1% to 10.7%!!!    Keep up the great work!    Aiyana

## 2017-05-08 NOTE — NURSING NOTE
"Chief Complaint   Patient presents with     RECHECK     DIABETES TYPE 1 F/U        Initial /73 (BP Location: Right arm, Patient Position: Chair, Cuff Size: Adult Regular)  Pulse 94  Ht 1.549 m (5' 1\")  Wt 49.9 kg (109 lb 14.4 oz)  BMI 20.77 kg/m2 Estimated body mass index is 20.77 kg/(m^2) as calculated from the following:    Height as of this encounter: 1.549 m (5' 1\").    Weight as of this encounter: 49.9 kg (109 lb 14.4 oz).  Medication Reconciliation: complete       Isis Shah CMA     "

## 2017-05-08 NOTE — PROGRESS NOTES
HPI:   Ashley is a 22 yo woman here with her mom for follow up of type 1 diabetes, diagnosed at age 2.  This has been poorly controlled for many years.  She has been feeling more motivated to take care of her diabetes and feels like she is making some progress.  She is still a bit fearful of seeing the high numbers, so is reluctant to test, but is encouraged to see more readings in the 100's.  She did have an unexplained episode of DKA last month (while wearing the diagnostic sensor).  She had not missed insulin, but had been running in the upper 200-300 range.  She was kept overnight, but did not have to go in the ICU.  She had an elevated white count, but never found a source for infection.   She liked wearing the sensor and did quite well in calibrating it and paying attention to it.  Ashley is currently testing her blood sugar twice a day upon waking and after returning from gymnastics.  Over the past month, her overall average was 287 mg/dL (range 156-537).  She boluses about 2-3 times a day.  She started changing her infusion set more often.     Ashley wears a Medtronic paradigm revel pump with basal rates as follows:     Mid- 0.675  Noon- 0.725    IC ratio- 1:15g (although she does not usually count carbs)  Sensitivity- 50  Target glucose-   Active insulin time- 4 hours    Average total daily insulin dose- 32 Units ( 54%basal, 46%bolus)    She will be seeing GI (Dr. Beasley) to establish care for her gastroparesis in a few days.  GI symptoms have been pretty good lately.       ROS  GENERAL: weight stable.  No fevers, chills, malaise, night sweats.   HEENT: no dysphagia, diplopia, neck pain or tenderness, dry/scratchy eyes, URI, cough, sinus drainage, tinnitus, sinus pressure  CV: no chest pain, pressure.  +palpitations occasionally.  Feels anxious.   LUNGS: no SOB, CARRASCO, cough, sputum production, wheezing   GI: no diarrhea, constipation, abdominal pain  EXTREMITIES: no rashes, ulcers,  edema  NEUROLOGY: no changes in vision, tingling or numbness in hands or feet.   MSK: no muscle aches or pains, weakness  PSYCH: anxiety a bit better    Past Medical History:   Diagnosis Date     Diabetes (H)     Diagnosed at 18 months old, currently with insulin pump       No past surgical history on file.    Family History   Problem Relation Age of Onset     DIABETES Mother      Hyperlipidemia Mother      MENTAL ILLNESS Mother      DIABETES Father      MENTAL ILLNESS Father      DIABETES Maternal Grandfather      Hypertension Maternal Grandfather      Hyperlipidemia Maternal Grandfather      Breast Cancer Paternal Grandmother      CEREBROVASCULAR DISEASE Paternal Grandfather      Family History:  Mother-Type 2 DM  Father-Type 2 DM  Great grand aunt-DM type 2  Uncles with Type 2 DM   No thyroid disease or cancers.     Social History     Social History     Marital Status: Single     Spouse Name: N/A     Number of Children: N/A     Years of Education: N/A     Social History Main Topics     Smoking status: Never Smoker      Smokeless tobacco: None     Alcohol Use: No     Drug Use: No     Sexual Activity: Not Asked     Other Topics Concern     None     Social History Narrative   Social Hx: Ashley is single, lives at home with her parents and older brother.  She is very interested in photography and was enrolled at the [employer], but stopped going to classes recently due to her health issues.  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       Current Outpatient Prescriptions   Medication     escitalopram (LEXAPRO) 20 MG tablet     ondansetron (ZOFRAN-ODT) 4 MG ODT tab     blood glucose monitoring (MILA CONTOUR NEXT) test strip     Multiple vitamin  s TABS     blood glucose monitoring (NO BRAND SPECIFIED) test strip     insulin aspart (NOVOLOG) 100 UNITS/ML VIAL     ketone blood test STRP     Insulin Pump Accessories (SNAP INSULIN PUMP CONTROLLER) MISC      "glucagon 1 MG injection     No current facility-administered medications for this visit.           Allergies   Allergen Reactions     No Clinical Screening - See Comments Hives and Rash     straw       Physical Exam  /73 (BP Location: Right arm, Patient Position: Chair, Cuff Size: Adult Regular)  Pulse 94  Ht 1.549 m (5' 1\")  Wt 49.9 kg (109 lb 14.4 oz)  BMI 20.77 kg/m2  GENERAL:  Alert and oriented X3, NAD, well dressed, answering questions appropriately, appears stated age.  EXTREMITIES: no edema, +pulses, no rashes, no lesions  RESULTS  Lab Results   Component Value Date    A1C 12.2 01/11/2016       TSH   Date Value Ref Range Status   10/31/2016 1.63 0.40 - 4.00 mU/L Final       Creatinine   Date Value Ref Range Status   10/31/2016 0.63 0.50 - 1.00 mg/dL Final   ]    No results found for: ALBUMIN]    No results found for: ALT]    No results for input(s): CHOL, HDL, LDL, TRIG, CHOLHDLRATIO in the last 47187 hours.    Assessment/Plan:     1.  Type 1 diabetes-  Overall, control is improving.  A1c has come down from 13.1% to 10.9% today.  She is making slow, steady progress and slowly making the changes she needs to maintain good control in the long term.  She did quite well on the Dexcom sensor, so will plan to order this. I emphasized the importance of testing her sugars at least once a day for safety (DKA prevention).   For now, she will focus on this, as well as focus on taking a bolus whenever she eats a meal or snack.  Her DKA episode was likely a insulin delivery problem with her pump. Much encouragement given for coming back to see me, for testing a bit more and for being willing to make some changes.     Will increase overnight basal rate as follows:   Mid- 0.725(up from 0.675)  9am- 0.775 (up from 0.725)    Anticipate we may need to tighten IC ratio in the future, but for now, the main focus is just covering whatever she eats.      2.  Risk factors-     Retinopathy:  No.  Had eye exam within last " year.   Nephropathy:  BP well controlled. Heart rate a bit high, but lower than in the past.  TSH ok.  No microalbuminuria.  Creatinine is stable.   Neuropathy: No.    Feet: OK, no ulcers.   Lipids: Need labs    3.  F/U in 6 weeks with me, in 6 months to establish care with Dr. Nadia Stern, sooner with concerns or hypoglycemia.     25/30 minute visit was spent counseling patient.     Aiyana Melendez PA-C, MPAS   [facility]  Department of Medicine  Division of Endocrinology and Diabetes

## 2017-05-10 ENCOUNTER — OFFICE VISIT (OUTPATIENT)
Dept: GASTROENTEROLOGY | Facility: CLINIC | Age: 22
End: 2017-05-10

## 2017-05-10 VITALS
BODY MASS INDEX: 20.24 KG/M2 | DIASTOLIC BLOOD PRESSURE: 73 MMHG | OXYGEN SATURATION: 98 % | TEMPERATURE: 98.5 F | SYSTOLIC BLOOD PRESSURE: 110 MMHG | HEIGHT: 62 IN | HEART RATE: 106 BPM | WEIGHT: 110 LBS

## 2017-05-10 DIAGNOSIS — R10.13 ABDOMINAL PAIN, EPIGASTRIC: ICD-10-CM

## 2017-05-10 DIAGNOSIS — K31.84 GASTROPARESIS: Primary | ICD-10-CM

## 2017-05-10 ASSESSMENT — PAIN SCALES - GENERAL: PAINLEVEL: NO PAIN (0)

## 2017-05-10 NOTE — LETTER
"5/10/2017       RE: Ashley Gonzales  [address] [facility] [address]     Dear Colleague,    Thank you for referring your patient, Ashley Gonzales, to the [facility] GASTROENTEROLOGY AND IBD at [facility]. Please see a copy of my visit note below.    GI CLINIC VISIT    CC: follow-up      ASSESSMENT/PLAN:  (K31.84) Gastroparesis (primary encounter diagnosis)  (R10.13) Abdominal pain, epigastric  Comment:   Gastroparesis diagnosed in 2014 in the setting of Type 1 diabetes.   Noted pt with few symptoms (no n/v, no early satiety, no weight loss, no pain) at this time, while eating small, frequent meals. We, again, reviewed importance of a varied diet including all food groups (not just carbs) and Ms. Gonzales feels she's been doing more of this lately. Reviewed need to avoid large fat and large fiber loads. We discussed measures to avoid over-eating and help \"settle\" stomach - pt prefers trial of crackers upon awakening as opposed to starting additional medications (such as PPI, antacid, etc). With minimal symptoms I would not initiate any further gastroparesis interventions - notably degree of retained food on GES does not correlate with symptom severity.   Pain markedly improved - note worsening with over-eating. Pt self-reporting impact of stress on severity/intensity of pain. She was encouraged to continue her excellent work on making healthy diet choices and continuing her efforts at blood glucose control. We discussed the possibility of meeting with our GI health psychologist, Judith Lindo, to address the impact her chronic illness (i.e. Gastroparesis as well as diabetes) on daily life and functioning.  Ms. Gonzales will consider this.   Plan:   1. Continue your excellent work with endocrinology clinic.  2. Continue to snack/eat small meals frequently throughout the day.   3. Consider having ryan crackers by your bed to \"settle\" your stomach in the morning upon awakening and prevent " "episodes of \"over-eating\" which can lead to more symptoms from gastroparesis.   4. Follow-up in 4-6 months  5. Let us know if you'd be interested in meeting with our GI health psychologist, Judith Lindo.       RTC 4-6    It was a pleasure to participate in the care of this patient; please contact us with any further questions.  A total of 20 minutes was spent with this patient, >50% of which was counseling regarding the above delineated issues.    Patricia Beasley MD   of Medicine  Division of Gastroenterology, Hepatology and Nutrition  [facility]    ---------------------------------------------------------------------------------------------------  HPI:   Ms. Gonzales is a 21 year old female with type 1 diabetes and gastroparesis who was initially seen in our GI clinic in April 2016. She was unable to make previous return appointments and presents today for follow-up. As at her initial visit, she is accompanied by her mother who provides additional history as well.      Summarized from my prior documentation:  Ms. Gonzales reports having issues with abdominal pain which began around age 10-11. Initially the pain was intermittent but progressed becoming more frequent and intense. Around age 11-12 she was diagnosed with H pylori and reported improvement in her pain after treatment.  Around age 15 she was treated for H pylori again and states she felt \"a little better\" for a period of time, but never really felt as if her discomfort totally resolved.     At age 18 (about March 2014), Ms. Gonzales states her pain got much worse. She details noting pain typically in the morning just upon awakening which would worsen with eating. The pain was felt to be quite sharp and was occuring more frequently. It was accompanied by occasional nausea though she denies any other associated symptoms. A work-up was undertaken near her home, in [place] with her PCP, Dr. Santos, which reportedly included an EGD and " "imaging studies including an US on 3/12/14 with a CBD at 6.5 mm though no noted choledocholithiasis and no cholelithiasis, pelvic US on 3/12/14 which was unremarkable, CT abd/pelvis 3/13/14 which had no acute pathology, and a partial EGD report from 3/18/14 with mild gastritis/duodenitis and bile reflux. Pathology results from that EGD are not available. She was referred to [facility] and seen from April through July 2014. Available documentation references normal celiac testing, normal TSH, and a normal HIDA scan (GB EF 93%). She was continued on a PPIs for \"residual gastritis\" though this only partially helped her pain. A GES was ordered through [facility] demonstrated only 55% emptying at 4 hours (per [facility] documentation).  Ms. Gonzales was on erythromycin for 9 months which improved her symptoms for a period of time.     At the time of her initial GI visit with this writer, Ms. Gonzales was off of all prokinetics and was not on any antiemetics. She was no longer on a PPI. Her symptoms included epigastric pain with no radiation, essentially unchanged, in Ms. Gonzales' view, since age 12. She had difficultly characterizing her pain noting it is \"sharp\", \"kind of burning\" and, sometimes \"aching\". The pain is \"constant\" but varies in intensity. Interestingly it did not really inhibit her daily activities including eating, sleeping, and working.    In regards to her gastroparesis, Ms. Gonzales endorsed some early satiety (with no relation to her pain). Despite this early satiety she was able to eat smaller meals and snacks throughout the day. She endorsed an appetite, denied nausea and episodes of vomiting.  She and her mother stated that her weight had been stable for over two years. Ms. Gonzales was struggling with her diabetes management at that time as well with prior DKA admissions.    Today, Ms. Gonzales and her mother share that they've had a very positive experience with Dr. Malik of endocrinology. Ms. Gonzales has taken a more active role in " "her diabetes care and she, excitedly, shares that her Hgb A1c is down from >13 to the 10 range. Her mother states they are both looking forward to using continuous glucose monitor through endocrinology clinic in the near future. Ms. Gonzales states that in the last year, she has had only one hospitalization for DKA. Ms. Gonzales' mother shares that the past year, her daughter has been reluctant to follow-through on recommendations and see members of her care team, but in the last few months has been more willing to see her physicians.      Ms. Gonzales has had no hospitalizations for nausea/vomiting or abdominal pain. Overall she feels she is doing \"better\". Ms. Gonzales states that with \"less stress my stomach doesn't hurt as much.\" She is no longer experiencing daily and near continuous epigastric pain. She recalls some infrequent pain episodes but as they happen so rarely, she cannot quantify or qualify them further. She believes the most recent epigastric pain episode occurred after eating a large amount of Chinese food shortly after awakening with an \"unsettled\" stomach. Ms. Gonzales has gained a few pounds in the last few months, stating she was at 105 lbs at her lowest and is up to 110 lbs today ( was 109 at last GI visit one year ago). There have been no vomiting episodes and Ms. Gonzales denies any nausea. She has not experienced any recent periods of early satiety and continues to endorse a good appetite. She does not wake at night due to GI symptoms.      In a typical day her diet is as follows:  Goes to bed around 2-3 a.m, wakes up after 12:00 noon, often around 2:00 pm.  First meal around 2-4 pm: typically a small meal - maybe leftover hotdish with no sides  Eats every 1-2 hours snacks: strawberries, crackers and cheese, pickles  Last meal around 1-2 a.m.: larger amount than a snack, mac 'n cheese (less of this as of late), leftovers from family's evening meal),  then goes to bed around 2-3 a.m.        ROS:    Remainder of " "comprehensive ROS is negative.    PROBLEM LIST  Patient Active Problem List    Diagnosis Date Noted     Diabetes mellitus type 1 (H) 09/19/2016     Priority: Medium     Gastroparesis due to DM (H) 04/01/2014     Priority: Medium       PERTINENT MEDICATIONS:  Current Outpatient Prescriptions   Medication     escitalopram (LEXAPRO) 20 MG tablet     ondansetron (ZOFRAN-ODT) 4 MG ODT tab     blood glucose monitoring (MILA CONTOUR NEXT) test strip     Multiple vitamin  s TABS     blood glucose monitoring (NO BRAND SPECIFIED) test strip     insulin aspart (NOVOLOG) 100 UNITS/ML VIAL     ketone blood test STRP     Insulin Pump Accessories (SNAP INSULIN PUMP CONTROLLER) MISC     glucagon 1 MG injection     No current facility-administered medications for this visit.          PHYSICAL EXAMINATION:  Vitals /73 (BP Location: Left arm, Patient Position: Chair, Cuff Size: Adult Regular)  Pulse 106  Temp 98.5  F (36.9  C)  Ht 1.575 m (5' 2\")  Wt 49.9 kg (110 lb)  SpO2 98%  BMI 20.12 kg/m2   Wt   Wt Readings from Last 2 Encounters:   05/10/17 49.9 kg (110 lb)   05/08/17 49.9 kg (109 lb 14.4 oz)   Gen: Pt sitting upin NAD, interactive and cooperative on exam  Eyes: sclerae anicteric, no injection  ENT:  MMM  Cardiac: RRR, nl S1, S2, no peripheral edema  Resp: Clear on anterior exam  GI: Normoactive BS, abd soft, flat, nontender  Skin: Warm, dry, no jaundice, nails appear healthy  Neuro: alert, oriented, answers questions appropriately      PERTINENT STUDIES:    Office Visit on 05/08/2017   Component Date Value Ref Range Status     Hemoglobin A1C 05/08/2017 10.7* 4.3 - 6 % Final       Again, thank you for allowing me to participate in the care of your patient.      Sincerely,    Patricia Beasley MD      "

## 2017-05-10 NOTE — NURSING NOTE
"Chief Complaint   Patient presents with     RECHECK     f/u       Vitals:    05/10/17 1553   BP: 110/73   BP Location: Left arm   Patient Position: Chair   Cuff Size: Adult Regular   Pulse: 106   Temp: 98.5  F (36.9  C)   SpO2: 98%   Weight: 110 lb   Height: 5' 2\"       Body mass index is 20.12 kg/(m^2).    Mauricio Armas MA                          "

## 2017-05-10 NOTE — PROGRESS NOTES
"GI CLINIC VISIT    CC: follow-up      ASSESSMENT/PLAN:  (K31.84) Gastroparesis (primary encounter diagnosis)  (R10.13) Abdominal pain, epigastric  Comment:   Gastroparesis diagnosed in 2014 in the setting of Type 1 diabetes.   Noted pt with few symptoms (no n/v, no early satiety, no weight loss, no pain) at this time, while eating small, frequent meals. We, again, reviewed importance of a varied diet including all food groups (not just carbs) and Ms. Gonzales feels she's been doing more of this lately. Reviewed need to avoid large fat and large fiber loads. We discussed measures to avoid over-eating and help \"settle\" stomach - pt prefers trial of crackers upon awakening as opposed to starting additional medications (such as PPI, antacid, etc). With minimal symptoms I would not initiate any further gastroparesis interventions - notably degree of retained food on GES does not correlate with symptom severity.   Pain markedly improved - note worsening with over-eating. Pt self-reporting impact of stress on severity/intensity of pain. She was encouraged to continue her excellent work on making healthy diet choices and continuing her efforts at blood glucose control. We discussed the possibility of meeting with our GI health psychologist, Judith Lindo, to address the impact her chronic illness (i.e. Gastroparesis as well as diabetes) on daily life and functioning.  Ms. Gonzales will consider this.   Plan:   1. Continue your excellent work with endocrinology clinic.  2. Continue to snack/eat small meals frequently throughout the day.   3. Consider having ryan crackers by your bed to \"settle\" your stomach in the morning upon awakening and prevent episodes of \"over-eating\" which can lead to more symptoms from gastroparesis.   4. Follow-up in 4-6 months  5. Let us know if you'd be interested in meeting with our GI health psychologist, Judith Lindo.       RTC 4-6    It was a pleasure to participate in the care of this patient; " "please contact us with any further questions.  A total of 20 minutes was spent with this patient, >50% of which was counseling regarding the above delineated issues.    Patricia Beasley MD   of Medicine  Division of Gastroenterology, Hepatology and Nutrition  [facility]    ---------------------------------------------------------------------------------------------------  HPI:   Ms. Gonzales is a 21 year old female with type 1 diabetes and gastroparesis who was initially seen in our GI clinic in April 2016. She was unable to make previous return appointments and presents today for follow-up. As at her initial visit, she is accompanied by her mother who provides additional history as well.      Summarized from my prior documentation:  Ms. Gonzales reports having issues with abdominal pain which began around age 10-11. Initially the pain was intermittent but progressed becoming more frequent and intense. Around age 11-12 she was diagnosed with H pylori and reported improvement in her pain after treatment.  Around age 15 she was treated for H pylori again and states she felt \"a little better\" for a period of time, but never really felt as if her discomfort totally resolved.     At age 18 (about March 2014), Ms. Gonzales states her pain got much worse. She details noting pain typically in the morning just upon awakening which would worsen with eating. The pain was felt to be quite sharp and was occuring more frequently. It was accompanied by occasional nausea though she denies any other associated symptoms. A work-up was undertaken near her home, in [place] with her PCP, Dr. Santos, which reportedly included an EGD and imaging studies including an US on 3/12/14 with a CBD at 6.5 mm though no noted choledocholithiasis and no cholelithiasis, pelvic US on 3/12/14 which was unremarkable, CT abd/pelvis 3/13/14 which had no acute pathology, and a partial EGD report from 3/18/14 with mild " "gastritis/duodenitis and bile reflux. Pathology results from that EGD are not available. She was referred to [facility] and seen from April through July 2014. Available documentation references normal celiac testing, normal TSH, and a normal HIDA scan (GB EF 93%). She was continued on a PPIs for \"residual gastritis\" though this only partially helped her pain. A GES was ordered through [facility] demonstrated only 55% emptying at 4 hours (per [facility] documentation).  Ms. Gonzales was on erythromycin for 9 months which improved her symptoms for a period of time.     At the time of her initial GI visit with this writer, Ms. Gonzales was off of all prokinetics and was not on any antiemetics. She was no longer on a PPI. Her symptoms included epigastric pain with no radiation, essentially unchanged, in Ms. Gonzales' view, since age 12. She had difficultly characterizing her pain noting it is \"sharp\", \"kind of burning\" and, sometimes \"aching\". The pain is \"constant\" but varies in intensity. Interestingly it did not really inhibit her daily activities including eating, sleeping, and working.    In regards to her gastroparesis, Ms. Gonzales endorsed some early satiety (with no relation to her pain). Despite this early satiety she was able to eat smaller meals and snacks throughout the day. She endorsed an appetite, denied nausea and episodes of vomiting.  She and her mother stated that her weight had been stable for over two years. Ms. Gonzales was struggling with her diabetes management at that time as well with prior DKA admissions.    Today, Ms. Gonzales and her mother share that they've had a very positive experience with Dr. Malik of endocrinology. Ms. Gonzales has taken a more active role in her diabetes care and she, excitedly, shares that her Hgb A1c is down from >13 to the 10 range. Her mother states they are both looking forward to using continuous glucose monitor through endocrinology clinic in the near future. Ms. Gonzales states that in the last year, " "she has had only one hospitalization for DKA. Ms. Gonzales' mother shares that the past year, her daughter has been reluctant to follow-through on recommendations and see members of her care team, but in the last few months has been more willing to see her physicians.      Ms. Gonzales has had no hospitalizations for nausea/vomiting or abdominal pain. Overall she feels she is doing \"better\". Ms. Gonzales states that with \"less stress my stomach doesn't hurt as much.\" She is no longer experiencing daily and near continuous epigastric pain. She recalls some infrequent pain episodes but as they happen so rarely, she cannot quantify or qualify them further. She believes the most recent epigastric pain episode occurred after eating a large amount of Chinese food shortly after awakening with an \"unsettled\" stomach. Ms. Gonzales has gained a few pounds in the last few months, stating she was at 105 lbs at her lowest and is up to 110 lbs today ( was 109 at last GI visit one year ago). There have been no vomiting episodes and Ms. Gonzales denies any nausea. She has not experienced any recent periods of early satiety and continues to endorse a good appetite. She does not wake at night due to GI symptoms.      In a typical day her diet is as follows:  Goes to bed around 2-3 a.m, wakes up after 12:00 noon, often around 2:00 pm.  First meal around 2-4 pm: typically a small meal - maybe leftover hotdish with no sides  Eats every 1-2 hours snacks: strawberries, crackers and cheese, pickles  Last meal around 1-2 a.m.: larger amount than a snack, mac 'n cheese (less of this as of late), leftovers from family's evening meal),  then goes to bed around 2-3 a.m.        ROS:    Remainder of comprehensive ROS is negative.    PROBLEM LIST  Patient Active Problem List    Diagnosis Date Noted     Diabetes mellitus type 1 (H) 09/19/2016     Priority: Medium     Gastroparesis due to DM (H) 04/01/2014     Priority: Medium       PERTINENT MEDICATIONS:  Current " "Outpatient Prescriptions   Medication     escitalopram (LEXAPRO) 20 MG tablet     ondansetron (ZOFRAN-ODT) 4 MG ODT tab     blood glucose monitoring (MILA CONTOUR NEXT) test strip     Multiple vitamin  s TABS     blood glucose monitoring (NO BRAND SPECIFIED) test strip     insulin aspart (NOVOLOG) 100 UNITS/ML VIAL     ketone blood test STRP     Insulin Pump Accessories (SNAP INSULIN PUMP CONTROLLER) MISC     glucagon 1 MG injection     No current facility-administered medications for this visit.          PHYSICAL EXAMINATION:  Vitals /73 (BP Location: Left arm, Patient Position: Chair, Cuff Size: Adult Regular)  Pulse 106  Temp 98.5  F (36.9  C)  Ht 1.575 m (5' 2\")  Wt 49.9 kg (110 lb)  SpO2 98%  BMI 20.12 kg/m2   Wt   Wt Readings from Last 2 Encounters:   05/10/17 49.9 kg (110 lb)   05/08/17 49.9 kg (109 lb 14.4 oz)   Gen: Pt sitting upin NAD, interactive and cooperative on exam  Eyes: sclerae anicteric, no injection  ENT:  MMM  Cardiac: RRR, nl S1, S2, no peripheral edema  Resp: Clear on anterior exam  GI: Normoactive BS, abd soft, flat, nontender  Skin: Warm, dry, no jaundice, nails appear healthy  Neuro: alert, oriented, answers questions appropriately      PERTINENT STUDIES:    Office Visit on 05/08/2017   Component Date Value Ref Range Status     Hemoglobin A1C 05/08/2017 10.7* 4.3 - 6 % Final       "

## 2017-05-10 NOTE — PATIENT INSTRUCTIONS
"1. Continue your excellent work with endocrinology clinic.  2. Continue to snack/eat small meals frequently throughout the day.   3. Consider having ryan crackers by your bed to \"settle\" your stomach in the morning upon awakening and prevent episodes of \"over-eating\" which can lead to more symptoms from gastroparesis.   4. Follow-up in 4-6 months  5. Let us know if you'd be interested in meeting with our GI health psychologist, Judith Lindo.   If you have any questions, please don't hesitate to contact our GI RN Clinic Coordinators at [phone] (select option #3 for nurse line).       "

## 2017-05-10 NOTE — MR AVS SNAPSHOT
"              After Visit Summary   5/10/2017    Ashley Gonzales    MRN: [id number]           Patient Information     Date Of Birth          1995        Visit Information        Provider Department      5/10/2017 3:40 PM Patricia Beasley MD [facility] Gastroenterology and IBD        Care Instructions    1. Continue your excellent work with endocrinology clinic.  2. Continue to snack/eat small meals frequently throughout the day.   3. Consider having ryna crackers by your bed to \"settle\" your stomach in the morning upon awakening and prevent episodes of \"over-eating\" which can lead to more symptoms from gastroparesis.   4. Follow-up in 4-6 months  5. Let us know if you'd be interested in meeting with our GI health psychologist, Judith Lindo.   If you have any questions, please don't hesitate to contact our GI RN Clinic Coordinators at [phone] (select option #3 for nurse line).             Follow-ups after your visit        Your next 10 appointments already scheduled     Jun 26, 2017 12:00 PM CDT   (Arrive by 11:45 AM)   RETURN DIABETES with Aiyana Melendez PA-C   [facility] Endocrinology ([facility])    [address] [place] [id number] [phone]            Nov 13, 2017  3:00 PM CST   (Arrive by 2:45 PM)   NEW DIABETES with Nadia Stern MD   [facility] Endocrinology ([facility])    [address] [place] [id number] [phone]              Who to contact     Please call your clinic at [phone] to:    Ask questions about your health    Make or cancel appointments    Discuss your medicines    Learn about your test results    Speak to your doctor   If you have compliments or concerns about an experience at your clinic, or if you wish to file a complaint, please contact [facility] Patient Relations at [phone] or email us at " [email or web address]         Additional Information About Your Visit        [employer] Information     [employer] gives you secure access to your electronic health record. If you see a primary care provider, you can also send messages to your care team and make appointments. If you have questions, please call your primary care clinic.  If you do not have a primary care provider, please call [phone] and they will assist you.      [employer] is an electronic gateway that provides easy, online access to your medical records. With [employer], you can request a clinic appointment, read your test results, renew a prescription or communicate with your care team.     To access your existing account, please contact your [facility] or call [phone] for assistance.        Care EveryWhere ID     This is your Care EveryWhere ID. This could be used by other organizations to access your [facility] medical records  [id number]        Your Vitals Were     Pulse Temperature Height Pulse Oximetry BMI (Body Mass Index)       106 98.5  F (36.9  C) 1.575 m (5' 2\") 98% 20.12 kg/m2        Blood Pressure from Last 3 Encounters:   05/10/17 110/73   05/08/17 107/73   03/27/17 118/75    Weight from Last 3 Encounters:   05/10/17 49.9 kg (110 lb)   05/08/17 49.9 kg (109 lb 14.4 oz)   03/27/17 49.9 kg (110 lb 1.6 oz)              Today, you had the following     No orders found for display       Primary Care Provider Office Phone # Fax #    Mamadou Martinez [phone]       [facility] [address]  [facility] [address]        Thank you!     Thank you for choosing [facility] GASTROENTEROLOGY AND IBD  for your care. Our goal is always to provide you with excellent care. Hearing back from our patients is one way we can continue to improve our services. Please take a few minutes to complete the written survey that you may receive in the mail after your visit with us. Thank " you!             Your Updated Medication List - Protect others around you: Learn how to safely use, store and throw away your medicines at www.disposemymeds.org.          This list is accurate as of: 5/10/17  4:27 PM.  Always use your most recent med list.                   Brand Name Dispense Instructions for use    * blood glucose monitoring test strip    no brand specified     TEST 6-8 TIMES DAILY       * blood glucose monitoring test strip    MILA CONTOUR NEXT    200 each    by In Vitro route 2 times daily Use to test blood sugar 2 times daily or as directed.       escitalopram 20 MG tablet    LEXAPRO     Take 20 mg by mouth       glucagon 1 MG kit      INJECT SUBCUTANEOUSLY AS DIRECTED AS NEEDED       insulin aspart 100 UNITS/ML injection    NovoLOG         ketone blood test Strp      As directed.       Multiple vitamin  s Tabs      Take 1 tablet by mouth       ondansetron 4 MG ODT tab    ZOFRAN-ODT     Place 4 mg under the tongue as needed       SNAP INSULIN PUMP CONTROLLER Misc      As directed. Novalog:  Gives a base rate, boluses according to 1-3 times/day       * Notice:  This list has 2 medication(s) that are the same as other medications prescribed for you. Read the directions carefully, and ask your doctor or other care provider to review them with you.

## 2017-06-14 ENCOUNTER — OFFICE VISIT (OUTPATIENT)
Dept: EDUCATION SERVICES | Facility: CLINIC | Age: 22
End: 2017-06-14

## 2017-06-14 DIAGNOSIS — E10.9 DIABETES MELLITUS TYPE 1 (H): Primary | ICD-10-CM

## 2017-06-14 NOTE — MR AVS SNAPSHOT
After Visit Summary   6/14/2017    Ashley Gonzales    MRN: [id number]           Patient Information     Date Of Birth          1995        Visit Information        Provider Department      6/14/2017 1:30 PM Danielle Ruiz RN [facility] Diabetes        Today's Diagnoses     Diabetes mellitus type 1 (H)    -  1       Follow-ups after your visit        Your next 10 appointments already scheduled     Jun 26, 2017 12:00 PM CDT   (Arrive by 11:45 AM)   RETURN DIABETES with JOSE ANTONIO Seaman [facility] Endocrinology ([facility])    [address] [place] [address] [phone]            Nov 13, 2017  3:00 PM CST   (Arrive by 2:45 PM)   NEW DIABETES with MD LEONEL Carr [facility] Endocrinology ([facility])    [address] [place] [address] [phone]              Who to contact     Please call your clinic at [phone] to:    Ask questions about your health    Make or cancel appointments    Discuss your medicines    Learn about your test results    Speak to your doctor   If you have compliments or concerns about an experience at your clinic, or if you wish to file a complaint, please contact [facility] Patient Relations at [phone] or email us at [email or web address]         Additional Information About Your Visit        MyChart Information     [employer] gives you secure access to your electronic health record. If you see a primary care provider, you can also send messages to your care team and make appointments. If you have questions, please call your primary care clinic.  If you do not have a primary care provider, please call [phone] and they will assist you.      [employer] is an electronic gateway that provides easy, online access to your medical records. With [employer], you can request a clinic appointment, read your test results,  renew a prescription or communicate with your care team.     To access your existing account, please contact your [facility] or call [phone] for assistance.        Care EveryWhere ID     This is your Care EveryWhere ID. This could be used by other organizations to access your [place] medical records  [id number]         Blood Pressure from Last 3 Encounters:   05/10/17 110/73   05/08/17 107/73   03/27/17 118/75    Weight from Last 3 Encounters:   05/10/17 49.9 kg (110 lb)   05/08/17 49.9 kg (109 lb 14.4 oz)   03/27/17 49.9 kg (110 lb 1.6 oz)              We Performed the Following     DIABETES EDUCATION - Individual  []        Primary Care Provider Office Phone # Fax #    Mamadou Martinez [phone]       [facility] [address] [facility] [address]        Thank you!     Thank you for choosing [facility] DIABETES  for your care. Our goal is always to provide you with excellent care. Hearing back from our patients is one way we can continue to improve our services. Please take a few minutes to complete the written survey that you may receive in the mail after your visit with us. Thank you!             Your Updated Medication List - Protect others around you: Learn how to safely use, store and throw away your medicines at www.disposemymeds.org.          This list is accurate as of: 6/14/17 11:59 PM.  Always use your most recent med list.                   Brand Name Dispense Instructions for use    * blood glucose monitoring test strip    no brand specified     TEST 6-8 TIMES DAILY       * blood glucose monitoring test strip    MILA CONTOUR NEXT    200 each    by In Vitro route 2 times daily Use to test blood sugar 2 times daily or as directed.       escitalopram 20 MG tablet    LEXAPRO     Take 20 mg by mouth       glucagon 1 MG kit      INJECT SUBCUTANEOUSLY AS DIRECTED AS NEEDED       insulin aspart 100 UNITS/ML injection    NovoLOG          ketone blood test Strp      As directed.       Multiple vitamin  s Tabs      Take 1 tablet by mouth       ondansetron 4 MG ODT tab    ZOFRAN-ODT     Place 4 mg under the tongue as needed       SNAP INSULIN PUMP CONTROLLER Misc      As directed. Novalog:  Gives a base rate, boluses according to 1-3 times/day       * Notice:  This list has 2 medication(s) that are the same as other medications prescribed for you. Read the directions carefully, and ask your doctor or other care provider to review them with you.

## 2017-06-15 NOTE — PROGRESS NOTES
PERSONAL CONTINUOUS GLUCOSE MONITORING SYSTEM INITIATION    Ashley Gonzales was seen today for initiation of a personal CGM system related to  Type 1 diabetes  Name of system:  Dexcom G5 Coninuous Glucose Monitor    CURRENT DIABETES MANAGEMENT    Insulin Pump Type: Medtronic Minimed 530G with Enlite Sensor    Patient glucose self monitoring as follows: three times daily.     INDICATIONS FOR CGM:  Optimize insulin pump settings     Patient was instructed in the following skills:    Explanation of difference between blood glucose and sensor glucose.  Insertion of sensor, technique, angle, site rotation, skin prep use, frequency of change.  Explanation of the effect of Tylenol on sensor glucose readings and cautioned that readings will be extremely unreliable for 4-6 hours after taking.   Cautioned that no decisions about treatment of hypo- or hyperglycemia can be based on a sensor reading, but must be double checked with a blood glucose.   Programming settings:  Hi and low alerts, rate alerts, predictive alerts,  out-of-range alerts and fixed low alert of 55 mg/mL.  Patient was guided in putting settings into , i-Phone, or both.   Calibration requirements:  A minimum of one BG calibration every 12 hours is needed for sensor readings to be displayed.   Instructed to never use the CGM reading displayed to calibrate the CGM.   Basic Care of transmitter and :      CGM SETTINGS:    Settings:                 Hi Glucose alert:       mg/mL       Hi Glucose Repeat     OFF   minutes    Low Glucose alert:  ON 80  mg/mL     Low Glucose Repeat OFF   minutes         Fall Rate                OFF    mg/mL/mn   Rise Rate                  OFF      mg/mL/mn       PLAN:    Any diabetes medication dose changes were made via the CDE Protocol and Collaborative Practice Agreement with the patient's referring provider. A copy of this encounter was shared with the provider.    FOLLOW UP:    She has a follow up  appointment with Aiyana Melendez PA-C on June 26, where her Dexcom report and pump settings will be reviewed.     Dexcom E-tamara was sent to Ashley.      Contact information given in case of problems or questions.     Time spent in this visit:  60 minutes

## 2017-06-26 ENCOUNTER — OFFICE VISIT (OUTPATIENT)
Dept: ENDOCRINOLOGY | Facility: CLINIC | Age: 22
End: 2017-06-26

## 2017-06-26 VITALS
BODY MASS INDEX: 20.74 KG/M2 | SYSTOLIC BLOOD PRESSURE: 109 MMHG | HEIGHT: 62 IN | DIASTOLIC BLOOD PRESSURE: 74 MMHG | WEIGHT: 112.7 LBS | HEART RATE: 96 BPM

## 2017-06-26 DIAGNOSIS — E10.65 POORLY CONTROLLED TYPE 1 DIABETES MELLITUS (H): ICD-10-CM

## 2017-06-26 ASSESSMENT — PAIN SCALES - GENERAL: PAINLEVEL: NO PAIN (0)

## 2017-06-26 NOTE — LETTER
6/26/2017       RE: Ashley Gonzales  [address] [facility] [address]     Dear Colleague,    Thank you for referring your patient, Ashley Gonzales, to the [facility] ENDOCRINOLOGY at [facility]. Please see a copy of my visit note below.    HPI:   Ashley is a 22 yo woman here with her mom for follow up of type 1 diabetes, diagnosed at age 2.  This has been poorly controlled for many years.  She has been feeling more motivated to take care of her diabetes and feels like she is making some progress.  She is now wearing her dexcom sensor and has liked wearing it.  She is calibrating twice daily.   Her overall average is 241 mg/dl, sensor average is 219 mg/dL.  She boluses about 2-3 times a day.  She started changing her infusion set more often.     Ashley wears a Medtronic paradigm revel pump with basal rates as follows:     Mid- 0.725  9am- 0.775     IC ratio- 1:15g (although she does not usually count carbs)  Sensitivity- 50  Target glucose-   Active insulin time- 4 hours    Average total daily insulin dose- 33 Units (54%basal, 46%bolus)    Her gastroparesis symptoms are much improved.       ROS  GENERAL: weight stable.  No fevers, chills, malaise, night sweats.   HEENT: no dysphagia, diplopia, neck pain or tenderness, dry/scratchy eyes, URI, cough, sinus drainage, tinnitus, sinus pressure  CV: no chest pain, pressure.  +palpitations occasionally.  Feels anxious.   LUNGS: no SOB, CARRASCO, cough, sputum production, wheezing   GI: no diarrhea, constipation, abdominal pain  EXTREMITIES: no rashes, ulcers, edema  NEUROLOGY: no changes in vision, tingling or numbness in hands or feet.   MSK: no muscle aches or pains, weakness  PSYCH: anxiety a bit better    Past Medical History:   Diagnosis Date     Diabetes (H)     Diagnosed at 18 months old, currently with insulin pump       No past surgical history on file.    Family History   Problem Relation Age of Onset     DIABETES Mother  "     Hyperlipidemia Mother      MENTAL ILLNESS Mother      DIABETES Father      MENTAL ILLNESS Father      DIABETES Maternal Grandfather      Hypertension Maternal Grandfather      Hyperlipidemia Maternal Grandfather      Breast Cancer Paternal Grandmother      CEREBROVASCULAR DISEASE Paternal Grandfather      Family History:  Mother-Type 2 DM  Father-Type 2 DM  Great grand aunt-DM type 2  Uncles with Type 2 DM   No thyroid disease or cancers.     Social History     Social History     Marital Status: Single     Spouse Name: N/A     Number of Children: N/A     Years of Education: N/A     Social History Main Topics     Smoking status: Never Smoker      Smokeless tobacco: None     Alcohol Use: No     Drug Use: No     Sexual Activity: Not Asked     Other Topics Concern     None     Social History Narrative   Social Hx: Ashley is single, lives at home with her parents and older brother.  She is very interested in photography and was enrolled at the [employer], but stopped going to classes recently due to her health issues.  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       Current Outpatient Prescriptions   Medication     escitalopram (LEXAPRO) 20 MG tablet     ondansetron (ZOFRAN-ODT) 4 MG ODT tab     blood glucose monitoring (MILA CONTOUR NEXT) test strip     Multiple vitamin  s TABS     blood glucose monitoring (NO BRAND SPECIFIED) test strip     insulin aspart (NOVOLOG) 100 UNITS/ML VIAL     ketone blood test STRP     Insulin Pump Accessories (SNAP INSULIN PUMP CONTROLLER) MISC     glucagon 1 MG injection     No current facility-administered medications for this visit.           Allergies   Allergen Reactions     No Clinical Screening - See Comments Hives and Rash     straw       Physical Exam  /74 (BP Location: Right arm, Patient Position: Chair, Cuff Size: Adult Regular)  Pulse 96  Ht 1.575 m (5' 2\")  Wt 51.1 kg (112 lb 11.2 oz)  BMI 20.61 " kg/m2  GENERAL:  Alert and oriented X3, NAD, well dressed, answering questions appropriately, appears stated age.  EXTREMITIES: no edema, +pulses, no rashes, no lesions    RESULTS  Lab Results   Component Value Date    A1C 12.2 01/11/2016       TSH   Date Value Ref Range Status   10/31/2016 1.63 0.40 - 4.00 mU/L Final       Creatinine   Date Value Ref Range Status   10/31/2016 0.63 0.50 - 1.00 mg/dL Final   ]    No results found for: ALBUMIN]    No results found for: ALT]    No results for input(s): CHOL, HDL, LDL, TRIG, CHOLHDLRATIO in the last 21794 hours.    No results found for: B12]    Assessment/Plan:     1.  Type 1 diabetes-  Overall, control is improving.  She is making slow, steady progress and slowly making the changes she needs to maintain good control in the long term.  She is now wearing the dexcom sensor and is responding quite well.  She is dropping a bit in the night and climbing in the afternoon, even without eating.     Will make the following changes:   New basal rates:   Mid- 0.7 (down from 0.725)  9am- 0.775 (no change)  Noon- 0.8 (new setting- up from 0.775)  8pm- 0.75 (new setting)    Anticipate we may need to tighten IC ratio in the future, but for now, the main focus is just covering whatever she eats.      Will contact Danielle Ruiz to review pumps- she might like to order a different pump.     2.  Risk factors-     Retinopathy:  No.  Had eye exam within last year.   Nephropathy:  BP well controlled. Heart rate a bit high, but lower than in the past.  TSH ok.  No microalbuminuria.  Creatinine is stable.   Neuropathy: No.    Feet: OK, no ulcers.   Lipids: Need labs    3.  F/U in 6 weeks with me, in 3 months to establish care with Dr. Nadia Stern, sooner with concerns or hypoglycemia.     I spent 30 minutes with this patient face to face and explained the conditions and plans (more than 50% of time was counseling/coordination of care, diabetes management, follow up plan for worsening hyper  and hypoglycemia) . The patient understood and is satisfied with today's visit.       Aiyana Melendez PA-C, MPAS   [facility]  Department of Medicine  Division of Endocrinology and Diabetes

## 2017-06-26 NOTE — PROGRESS NOTES
HPI:   Ashley is a 22 yo woman here with her mom for follow up of type 1 diabetes, diagnosed at age 2.  This has been poorly controlled for many years.  She has been feeling more motivated to take care of her diabetes and feels like she is making some progress.  She is now wearing her dexcom sensor and has liked wearing it.  She is calibrating twice daily.   Her overall average is 241 mg/dl, sensor average is 219 mg/dL.  She boluses about 2-3 times a day.  She started changing her infusion set more often.     Ashley wears a Medtronic paradigm revel pump with basal rates as follows:     Mid- 0.725  9am- 0.775     IC ratio- 1:15g (although she does not usually count carbs)  Sensitivity- 50  Target glucose-   Active insulin time- 4 hours    Average total daily insulin dose- 33 Units (54%basal, 46%bolus)    Her gastroparesis symptoms are much improved.       ROS  GENERAL: weight stable.  No fevers, chills, malaise, night sweats.   HEENT: no dysphagia, diplopia, neck pain or tenderness, dry/scratchy eyes, URI, cough, sinus drainage, tinnitus, sinus pressure  CV: no chest pain, pressure.  +palpitations occasionally.  Feels anxious.   LUNGS: no SOB, CARRASCO, cough, sputum production, wheezing   GI: no diarrhea, constipation, abdominal pain  EXTREMITIES: no rashes, ulcers, edema  NEUROLOGY: no changes in vision, tingling or numbness in hands or feet.   MSK: no muscle aches or pains, weakness  PSYCH: anxiety a bit better    Past Medical History:   Diagnosis Date     Diabetes (H)     Diagnosed at 18 months old, currently with insulin pump       No past surgical history on file.    Family History   Problem Relation Age of Onset     DIABETES Mother      Hyperlipidemia Mother      MENTAL ILLNESS Mother      DIABETES Father      MENTAL ILLNESS Father      DIABETES Maternal Grandfather      Hypertension Maternal Grandfather      Hyperlipidemia Maternal Grandfather      Breast Cancer Paternal Grandmother       "CEREBROVASCULAR DISEASE Paternal Grandfather      Family History:  Mother-Type 2 DM  Father-Type 2 DM  Great grand aunt-DM type 2  Uncles with Type 2 DM   No thyroid disease or cancers.     Social History     Social History     Marital Status: Single     Spouse Name: N/A     Number of Children: N/A     Years of Education: N/A     Social History Main Topics     Smoking status: Never Smoker      Smokeless tobacco: None     Alcohol Use: No     Drug Use: No     Sexual Activity: Not Asked     Other Topics Concern     None     Social History Narrative   Social Hx: Ashley is single, lives at home with her parents and older brother.  She is very interested in photography and was enrolled at the [employer], but stopped going to classes recently due to her health issues.  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       Current Outpatient Prescriptions   Medication     escitalopram (LEXAPRO) 20 MG tablet     ondansetron (ZOFRAN-ODT) 4 MG ODT tab     blood glucose monitoring (MILA CONTOUR NEXT) test strip     Multiple vitamin  s TABS     blood glucose monitoring (NO BRAND SPECIFIED) test strip     insulin aspart (NOVOLOG) 100 UNITS/ML VIAL     ketone blood test STRP     Insulin Pump Accessories (SNAP INSULIN PUMP CONTROLLER) MISC     glucagon 1 MG injection     No current facility-administered medications for this visit.           Allergies   Allergen Reactions     No Clinical Screening - See Comments Hives and Rash     straw       Physical Exam  /74 (BP Location: Right arm, Patient Position: Chair, Cuff Size: Adult Regular)  Pulse 96  Ht 1.575 m (5' 2\")  Wt 51.1 kg (112 lb 11.2 oz)  BMI 20.61 kg/m2  GENERAL:  Alert and oriented X3, NAD, well dressed, answering questions appropriately, appears stated age.  EXTREMITIES: no edema, +pulses, no rashes, no lesions    RESULTS  Lab Results   Component Value Date    A1C 12.2 01/11/2016       TSH   Date Value Ref Range " Status   10/31/2016 1.63 0.40 - 4.00 mU/L Final       Creatinine   Date Value Ref Range Status   10/31/2016 0.63 0.50 - 1.00 mg/dL Final   ]    No results found for: ALBUMIN]    No results found for: ALT]    No results for input(s): CHOL, HDL, LDL, TRIG, CHOLHDLRATIO in the last 38690 hours.    No results found for: B12]    Assessment/Plan:     1.  Type 1 diabetes-  Overall, control is improving.  She is making slow, steady progress and slowly making the changes she needs to maintain good control in the long term.  She is now wearing the dexcom sensor and is responding quite well.  She is dropping a bit in the night and climbing in the afternoon, even without eating.     Will make the following changes:   New basal rates:   Mid- 0.7 (down from 0.725)  9am- 0.775 (no change)  Noon- 0.8 (new setting- up from 0.775)  8pm- 0.75 (new setting)    Anticipate we may need to tighten IC ratio in the future, but for now, the main focus is just covering whatever she eats.      Will contact Danielle Ruiz to review pumps- she might like to order a different pump.     2.  Risk factors-     Retinopathy:  No.  Had eye exam within last year.   Nephropathy:  BP well controlled. Heart rate a bit high, but lower than in the past.  TSH ok.  No microalbuminuria.  Creatinine is stable.   Neuropathy: No.    Feet: OK, no ulcers.   Lipids: Need labs    3.  F/U in 6 weeks with me, in 3 months to establish care with Dr. Nadia Stern, sooner with concerns or hypoglycemia.     I spent 30 minutes with this patient face to face and explained the conditions and plans (more than 50% of time was counseling/coordination of care, diabetes management, follow up plan for worsening hyper and hypoglycemia) . The patient understood and is satisfied with today's visit.       Aiyana Melendez PA-C, MPAS   [facility]  Department of Medicine  Division of Endocrinology and Diabetes

## 2017-06-26 NOTE — MR AVS SNAPSHOT
After Visit Summary   6/26/2017    Ashley Gonzales    MRN: [id number]           Patient Information     Date Of Birth          1995        Visit Information        Provider Department      6/26/2017 12:00 PM Aiyana Melendez PA-C [facility]        Today's Diagnoses     Poorly controlled type 1 diabetes mellitus (H)          Care Instructions    Keep up the great work, Ashley!      New basal rates:   Mid- 0.7 (down from 0.725)  9am- 0.775 (no change)  Noon- 0.8 (new setting- up from 0.775)  8pm- 0.75 (new setting)    Work on taking all recommended boluses.      If you go low, then we need to change your setting.     Please wear your sensor regularly.     Contact Danielle Ruiz about ordering a new pump.             Follow-ups after your visit        Your next 10 appointments already scheduled     Aug 07, 2017  2:00 PM CDT   (Arrive by 1:45 PM)   RETURN DIABETES with JOSE ANTONIO Seaman [facility] ([facility])    [address] [place] [id number] [phone]            Nov 13, 2017  3:00 PM CST   (Arrive by 2:45 PM)   NEW DIABETES with Nadia Stern MD   [facility] ([facility])    [address] [place] [address] [phone]              Future tests that were ordered for you today     Open Future Orders        Priority Expected Expires Ordered    Ketones urine Routine  6/27/2018 6/26/2017            Who to contact     Please call your clinic at [phone] to:    Ask questions about your health    Make or cancel appointments    Discuss your medicines    Learn about your test results    Speak to your doctor   If you have compliments or concerns about an experience at your clinic, or if you wish to file a complaint, please contact [facility] Patient Relations at [phone] or email us at  [email or web address]         Additional Information About Your Visit        [employer] Information     [employer] gives you secure access to your electronic health record. If you see a primary care provider, you can also send messages to your care team and make appointments. If you have questions, please call your primary care clinic.  If you do not have a primary care provider, please call [phone] and they will assist you.      [employer] is an electronic gateway that provides easy, online access to your medical records. With [employer], you can request a clinic appointment, read your test results, renew a prescription or communicate with your care team.     To access your existing account, please contact your [facility] or call [phone] for assistance.        Care EveryWhere ID     This is your Care EveryWhere ID. This could be used by other organizations to access your [place] medical records  [id number]        Your Vitals Were     Pulse Height BMI (Body Mass Index)             96 1.575 m (5' 2\") 20.61 kg/m2          Blood Pressure from Last 3 Encounters:   06/26/17 109/74   05/10/17 110/73   05/08/17 107/73    Weight from Last 3 Encounters:   06/26/17 51.1 kg (112 lb 11.2 oz)   05/10/17 49.9 kg (110 lb)   05/08/17 49.9 kg (109 lb 14.4 oz)                 Today's Medication Changes          These changes are accurate as of: 6/26/17  1:18 PM.  If you have any questions, ask your nurse or doctor.               These medicines have changed or have updated prescriptions.        Dose/Directions    * blood glucose monitoring test strip   Commonly known as:  no brand specified   This may have changed:  Another medication with the same name was changed. Make sure you understand how and when to take each.        TEST 6-8 TIMES DAILY   Refills:  0       * blood glucose monitoring test strip   Commonly known as:  MILA CONTOUR NEXT   This may have changed:    - how much to " take  - when to take this   Used for:  Poorly controlled type 1 diabetes mellitus (H)        Dose:  1 strip   1 strip by In Vitro route 3 times daily Use to test blood sugar 2 times daily or as directed.   Quantity:  200 each   Refills:  3       glucagon 1 MG kit   This may have changed:    - how much to take  - how to take this  - when to take this   Used for:  Poorly controlled type 1 diabetes mellitus (H)        Dose:  1 mg   Inject 1 mg into the muscle once for 1 dose INJECT SUBCUTANEOUSLY AS DIRECTED AS NEEDED   Quantity:  1 mg   Refills:  3       insulin aspart 100 UNITS/ML injection   Commonly known as:  NovoLOG   This may have changed:    - how to take this  - additional instructions   Used for:  Poorly controlled type 1 diabetes mellitus (H)        Use as directed in insulin pump up to 45 units daily   Quantity:  4 vial   Refills:  3       * Notice:  This list has 2 medication(s) that are the same as other medications prescribed for you. Read the directions carefully, and ask your doctor or other care provider to review them with you.         Where to get your medicines      These medications were sent to [facility] - [place], [place] - [facility] [address] [place] [address]  [address] [place] [address]     Phone:  [phone]     blood glucose monitoring test strip    glucagon 1 MG kit    insulin aspart 100 UNITS/ML injection    ketone blood test Strp                Primary Care Provider Office Phone # Fax #    Mamadou Martinez [phone]       [facility] [address] [facility] [address]        Equal Access to Services     SON[facility] Hospital[facility] AH: Hadii deny thorntono Sogaldino, waaxda luqadaha, qaybta kaalmada adeegyada, shasha mosqueda. So [facility] [phone].    ATENCIÓN: Si habla español, tiene a mar disposición servicios gratuitos de asistencia lingüística. Llame al [phone].    We comply with applicable federal civil rights laws and [place] laws. We do not  discriminate on the basis of race, color, national origin, age, disability sex, sexual orientation or gender identity.            Thank you!     Thank you for choosing [facility] ENDOCRINOLOGY  for your care. Our goal is always to provide you with excellent care. Hearing back from our patients is one way we can continue to improve our services. Please take a few minutes to complete the written survey that you may receive in the mail after your visit with us. Thank you!             Your Updated Medication List - Protect others around you: Learn how to safely use, store and throw away your medicines at www.disposemymeds.org.          This list is accurate as of: 6/26/17  1:18 PM.  Always use your most recent med list.                   Brand Name Dispense Instructions for use Diagnosis    * blood glucose monitoring test strip    no brand specified     TEST 6-8 TIMES DAILY        * blood glucose monitoring test strip    MILA CONTOUR NEXT    200 each    1 strip by In Vitro route 3 times daily Use to test blood sugar 2 times daily or as directed.    Poorly controlled type 1 diabetes mellitus (H)       escitalopram 20 MG tablet    LEXAPRO     Take 20 mg by mouth        glucagon 1 MG kit     1 mg    Inject 1 mg into the muscle once for 1 dose INJECT SUBCUTANEOUSLY AS DIRECTED AS NEEDED    Poorly controlled type 1 diabetes mellitus (H)       insulin aspart 100 UNITS/ML injection    NovoLOG    4 vial    Use as directed in insulin pump up to 45 units daily    Poorly controlled type 1 diabetes mellitus (H)       ketone blood test Strp     10 each    As directed.    Poorly controlled type 1 diabetes mellitus (H)       Multiple vitamin  s Tabs      Take 1 tablet by mouth        ondansetron 4 MG ODT tab    ZOFRAN-ODT     Place 4 mg under the tongue as needed        SNAP INSULIN PUMP CONTROLLER Misc      As directed. Novalog:  Gives a base rate, boluses according to 1-3 times/day        * Notice:  This list has 2 medication(s) that  are the same as other medications prescribed for you. Read the directions carefully, and ask your doctor or other care provider to review them with you.

## 2017-06-26 NOTE — NURSING NOTE
"Chief Complaint   Patient presents with     RECHECK     DIABETES TYPE 1 F/U        Initial /74 (BP Location: Right arm, Patient Position: Chair, Cuff Size: Adult Regular)  Pulse 96  Ht 1.575 m (5' 2\")  Wt 51.1 kg (112 lb 11.2 oz)  BMI 20.61 kg/m2 Estimated body mass index is 20.61 kg/(m^2) as calculated from the following:    Height as of this encounter: 1.575 m (5' 2\").    Weight as of this encounter: 51.1 kg (112 lb 11.2 oz).  Medication Reconciliation: complete       Isis Shah CMA     "

## 2017-06-26 NOTE — PATIENT INSTRUCTIONS
Keep up the great work, Ashley!      New basal rates:   Mid- 0.7 (down from 0.725)  9am- 0.775 (no change)  Noon- 0.8 (new setting- up from 0.775)  8pm- 0.75 (new setting)    Work on taking all recommended boluses.      If you go low, then we need to change your setting.     Please wear your sensor regularly.     Contact Danielle Ruiz about ordering a new pump.

## 2017-07-29 ENCOUNTER — HEALTH MAINTENANCE LETTER (OUTPATIENT)
Age: 22
End: 2017-07-29

## 2017-08-07 ENCOUNTER — OFFICE VISIT (OUTPATIENT)
Dept: ENDOCRINOLOGY | Facility: CLINIC | Age: 22
End: 2017-08-07

## 2017-08-07 VITALS
DIASTOLIC BLOOD PRESSURE: 69 MMHG | BODY MASS INDEX: 20.99 KG/M2 | WEIGHT: 114.1 LBS | SYSTOLIC BLOOD PRESSURE: 106 MMHG | HEIGHT: 62 IN | HEART RATE: 88 BPM

## 2017-08-07 DIAGNOSIS — E10.65 POORLY CONTROLLED TYPE 1 DIABETES MELLITUS (H): Primary | ICD-10-CM

## 2017-08-07 LAB — HBA1C MFR BLD: 10.1 % (ref 4.3–6)

## 2017-08-07 NOTE — LETTER
8/7/2017       RE: Ashley Gonzales  [address] [facility] [address]     Dear Colleague,    Thank you for referring your patient, Ashley Gonzales, to the [facility] ENDOCRINOLOGY at [facility]. Please see a copy of my visit note below.    HPI:   Ashley is a 20 yo woman here with her mom for follow up of type 1 diabetes, diagnosed at age 2.  This has been poorly controlled for many years.  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  This is more of a physical job than she is used to, but is looking forward to it.  She has been taking a break from her sensor, but realizes she does much better with it on.  She also finds that she tests more when she wears her sensor, as she has to calibrate it. She has been testing her blood sugar 1-2 times a day with an overall average of 209 mg/dL this month.  She has been having middle of the night lows lately, and she is not sure why.   No change in her diet or activity, other than her new job, which will be starting next week (she has only had training). She boluses about 2-3 times a day.       Ashley wears a Medtronic paradigm revel pump with basal rates as follows:     Mid- 0.725  9am- 0.775     IC ratio- 1:15g (although she does not usually count carbs)  Sensitivity- 50  Target glucose-   Active insulin time- 4 hours    Average total daily insulin dose- 33 Units (54%basal, 46%bolus)    Her gastroparesis symptoms are much improved. She reports trouble falling asleep and staying asleep, easily disturbed from sleep.       ROS  GENERAL: weight stable.  No fevers, chills, malaise, night sweats.   HEENT: no dysphagia, diplopia, neck pain or tenderness, dry/scratchy eyes, URI, cough, sinus drainage, tinnitus, sinus pressure  CV: no chest pain, pressure.  +palpitations occasionally.  Feels anxious.   LUNGS: no SOB, CARRASCO, cough, sputum production, wheezing   GI: no diarrhea, constipation, abdominal  pain  EXTREMITIES: no rashes, ulcers, edema  NEUROLOGY: no changes in vision, tingling or numbness in hands or feet.   MSK: no muscle aches or pains, weakness  PSYCH: anxiety a bit better    Past Medical History:   Diagnosis Date     Diabetes (H)     Diagnosed at 18 months old, currently with insulin pump       No past surgical history on file.    Family History   Problem Relation Age of Onset     DIABETES Mother      Hyperlipidemia Mother      MENTAL ILLNESS Mother      DIABETES Father      MENTAL ILLNESS Father      DIABETES Maternal Grandfather      Hypertension Maternal Grandfather      Hyperlipidemia Maternal Grandfather      Breast Cancer Paternal Grandmother      CEREBROVASCULAR DISEASE Paternal Grandfather      Family History:  Mother-Type 2 DM  Father-Type 2 DM  Great grand aunt-DM type 2  Uncles with Type 2 DM   No thyroid disease or cancers.     Social History     Social History     Marital Status: Single     Spouse Name: N/A     Number of Children: N/A     Years of Education: N/A     Social History Main Topics     Smoking status: Never Smoker      Smokeless tobacco: None     Alcohol Use: No     Drug Use: No     Sexual Activity: Not Asked     Other Topics Concern     None     Social History Narrative   Social Hx: Ashley is single, lives at home with her parents and older brother.  She is very interested in photography and was enrolled at the [employer], but stopped going to classes recently due to her health issues.  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       Current Outpatient Prescriptions   Medication     ketone blood test STRP     blood glucose monitoring (MILA CONTOUR NEXT) test strip     insulin aspart (NOVOLOG) 100 UNITS/ML injection     escitalopram (LEXAPRO) 20 MG tablet     ondansetron (ZOFRAN-ODT) 4 MG ODT tab     Multiple vitamin  s TABS     blood glucose monitoring (NO BRAND SPECIFIED)  "test strip     Insulin Pump Accessories (SNAP INSULIN PUMP CONTROLLER) MISC     glucagon 1 MG kit     No current facility-administered medications for this visit.           Allergies   Allergen Reactions     No Clinical Screening - See Comments Hives and Rash     straw       Physical Exam  /69 (BP Location: Right arm, Patient Position: Sitting, Cuff Size: Adult Regular)  Pulse 88  Ht 1.575 m (5' 2\")  Wt 51.8 kg (114 lb 1.6 oz)  BMI 20.87 kg/m2  GENERAL:  Alert and oriented X3, NAD, well dressed, answering questions appropriately, appears stated age.  EXTREMITIES: no edema, +pulses, no rashes, no lesions    RESULTS  Lab Results   Component Value Date    A1C 12.2 01/11/2016       TSH   Date Value Ref Range Status   10/31/2016 1.63 0.40 - 4.00 mU/L Final       Creatinine   Date Value Ref Range Status   10/31/2016 0.63 0.50 - 1.00 mg/dL Final   ]    No results found for: ALBUMIN]    No results found for: ALT]    No results for input(s): CHOL, HDL, LDL, TRIG, CHOLHDLRATIO in the last 63480 hours.    No results found for: B12]    Assessment/Plan:     1.  Type 1 diabetes-  Overall, control is improving.  A1c is down to 10.1%.  She stopped wearing her sensor, but will resume, as she realizes her control is much better when she pays attention.   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      Will make the following changes:   New basal rates:   Mid- 0.65 (down from 0.7)  9am- 0.775 (no change)  Noon- 0.8 (no change)  8pm- 0.75 (no change)    Anticipate we may need to tighten IC ratio in the future, but for now, the main focus is just covering whatever she eats.  She is not currently entering [employer], but will need to start doing so.     2.  Risk factors-     Retinopathy:  No.  Had eye exam within last year.   Nephropathy:  BP well controlled. Heart rate a bit high, but lower than in the past.  TSH ok.  No microalbuminuria.  " Creatinine is stable.   Neuropathy: No.    Feet: OK, no ulcers.   Lipids: Need labs  Sleep disturbance- has trouble falling and staying asleep.  This seems to be related to worries keeping her up a night.  I have suggested her seeing a health psychologist, but she is hesitant to establish with someone new. Will revisit in the future, and in the meantime, will f/u with PCP.  Also suggested more physical activity, yoga, avoiding caffeine, etc.     3.  F/U in 6 weeks with me, in 3 months to establish care with Dr. Nadia Stern, sooner with concerns or hypoglycemia.     I spent 30 minutes with this patient face to face and explained the conditions and plans (more than 50% of time was counseling/coordination of care, diabetes management, follow up plan for worsening hyper and hypoglycemia) . The patient understood and is satisfied with today's visit.       Aiyana Melendez PA-C, MPAS   [facility]  Department of Medicine  Division of Endocrinology and Diabetes

## 2017-08-07 NOTE — PROGRESS NOTES
HPI:   Ashley is a 22 yo woman here with her mom for follow up of type 1 diabetes, diagnosed at age 2.  This has been poorly controlled for many years.  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  This is more of a physical job than she is used to, but is looking forward to it.  She has been taking a break from her sensor, but realizes she does much better with it on.  She also finds that she tests more when she wears her sensor, as she has to calibrate it. She has been testing her blood sugar 1-2 times a day with an overall average of 209 mg/dL this month.  She has been having middle of the night lows lately, and she is not sure why.   No change in her diet or activity, other than her new job, which will be starting next week (she has only had training). She boluses about 2-3 times a day.       Ashley wears a Medtronic paradigm revel pump with basal rates as follows:     Mid- 0.725  9am- 0.775     IC ratio- 1:15g (although she does not usually count carbs)  Sensitivity- 50  Target glucose-   Active insulin time- 4 hours    Average total daily insulin dose- 33 Units (54%basal, 46%bolus)    Her gastroparesis symptoms are much improved. She reports trouble falling asleep and staying asleep, easily disturbed from sleep.       ROS  GENERAL: weight stable.  No fevers, chills, malaise, night sweats.   HEENT: no dysphagia, diplopia, neck pain or tenderness, dry/scratchy eyes, URI, cough, sinus drainage, tinnitus, sinus pressure  CV: no chest pain, pressure.  +palpitations occasionally.  Feels anxious.   LUNGS: no SOB, CARRASCO, cough, sputum production, wheezing   GI: no diarrhea, constipation, abdominal pain  EXTREMITIES: no rashes, ulcers, edema  NEUROLOGY: no changes in vision, tingling or numbness in hands or feet.   MSK: no muscle aches or pains, weakness  PSYCH: anxiety a bit better    Past Medical History:   Diagnosis Date     Diabetes (H)     Diagnosed at 18 months old,  currently with insulin pump       No past surgical history on file.    Family History   Problem Relation Age of Onset     DIABETES Mother      Hyperlipidemia Mother      MENTAL ILLNESS Mother      DIABETES Father      MENTAL ILLNESS Father      DIABETES Maternal Grandfather      Hypertension Maternal Grandfather      Hyperlipidemia Maternal Grandfather      Breast Cancer Paternal Grandmother      CEREBROVASCULAR DISEASE Paternal Grandfather      Family History:  Mother-Type 2 DM  Father-Type 2 DM  Great grand aunt-DM type 2  Uncles with Type 2 DM   No thyroid disease or cancers.     Social History     Social History     Marital Status: Single     Spouse Name: N/A     Number of Children: N/A     Years of Education: N/A     Social History Main Topics     Smoking status: Never Smoker      Smokeless tobacco: None     Alcohol Use: No     Drug Use: No     Sexual Activity: Not Asked     Other Topics Concern     None     Social History Narrative   Social Hx: Ashley is single, lives at home with her parents and older brother.  She is very interested in photography and was enrolled at the [employer], but stopped going to classes recently due to her health issues.  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       Current Outpatient Prescriptions   Medication     ketone blood test STRP     blood glucose monitoring (MILA CONTOUR NEXT) test strip     insulin aspart (NOVOLOG) 100 UNITS/ML injection     escitalopram (LEXAPRO) 20 MG tablet     ondansetron (ZOFRAN-ODT) 4 MG ODT tab     Multiple vitamin  s TABS     blood glucose monitoring (NO BRAND SPECIFIED) test strip     Insulin Pump Accessories (SNAP INSULIN PUMP CONTROLLER) MISC     glucagon 1 MG kit     No current facility-administered medications for this visit.           Allergies   Allergen Reactions     No Clinical Screening - See Comments Hives and Rash     straw  "      Physical Exam  /69 (BP Location: Right arm, Patient Position: Sitting, Cuff Size: Adult Regular)  Pulse 88  Ht 1.575 m (5' 2\")  Wt 51.8 kg (114 lb 1.6 oz)  BMI 20.87 kg/m2  GENERAL:  Alert and oriented X3, NAD, well dressed, answering questions appropriately, appears stated age.  EXTREMITIES: no edema, +pulses, no rashes, no lesions    RESULTS  Lab Results   Component Value Date    A1C 12.2 01/11/2016       TSH   Date Value Ref Range Status   10/31/2016 1.63 0.40 - 4.00 mU/L Final       Creatinine   Date Value Ref Range Status   10/31/2016 0.63 0.50 - 1.00 mg/dL Final   ]    No results found for: ALBUMIN]    No results found for: ALT]    No results for input(s): CHOL, HDL, LDL, TRIG, CHOLHDLRATIO in the last 81846 hours.    No results found for: B12]    Assessment/Plan:     1.  Type 1 diabetes-  Overall, control is improving.  A1c is down to 10.1%.  She stopped wearing her sensor, but will resume, as she realizes her control is much better when she pays attention.   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      Will make the following changes:   New basal rates:   Mid- 0.65 (down from 0.7)  9am- 0.775 (no change)  Noon- 0.8 (no change)  8pm- 0.75 (no change)    Anticipate we may need to tighten IC ratio in the future, but for now, the main focus is just covering whatever she eats.  She is not currently entering [employer], but will need to start doing so.     2.  Risk factors-     Retinopathy:  No.  Had eye exam within last year.   Nephropathy:  BP well controlled. Heart rate a bit high, but lower than in the past.  TSH ok.  No microalbuminuria.  Creatinine is stable.   Neuropathy: No.    Feet: OK, no ulcers.   Lipids: Need labs  Sleep disturbance- has trouble falling and staying asleep.  This seems to be related to worries keeping her up a night.  I have suggested her seeing a health psychologist, but she is " hesitant to establish with someone new. Will revisit in the future, and in the meantime, will f/u with PCP.  Also suggested more physical activity, yoga, avoiding caffeine, etc.     3.  F/U in 6 weeks with me, in 3 months to establish care with Dr. Nadia tSern, sooner with concerns or hypoglycemia.     I spent 30 minutes with this patient face to face and explained the conditions and plans (more than 50% of time was counseling/coordination of care, diabetes management, follow up plan for worsening hyper and hypoglycemia) . The patient understood and is satisfied with today's visit.       Aiyana Melendez PA-C, MPAS   [facility]  Department of Medicine  Division of Endocrinology and Diabetes

## 2017-08-07 NOTE — NURSING NOTE
"Chief Complaint   Patient presents with     RECHECK     DIABETES TYPE 1 F/U       Initial /69 (BP Location: Right arm, Patient Position: Sitting, Cuff Size: Adult Regular)  Pulse 88  Ht 1.575 m (5' 2\")  Wt 51.8 kg (114 lb 1.6 oz)  BMI 20.87 kg/m2 Estimated body mass index is 20.87 kg/(m^2) as calculated from the following:    Height as of this encounter: 1.575 m (5' 2\").    Weight as of this encounter: 51.8 kg (114 lb 1.6 oz).  Medication Reconciliation: complete       Performed A1C test - patient tolerated well.    Isis Shah, [facility]       "

## 2017-08-07 NOTE — PATIENT INSTRUCTIONS
A1c down to 10.1%- nice work!     Restart your sensor. This will keep you safe when you start your new job and help keep the sugars heading in the right direction.      To avoid lows in the night, will make the following changes:   New basal rates:   Mid- 0.65 (down from 0.7)  9am- 0.775 (no change)  Noon- 0.8 (no change)  8pm- 0.75 (no change)

## 2017-08-07 NOTE — MR AVS SNAPSHOT
After Visit Summary   8/7/2017    Ashley Gonzales    MRN: [id number]           Patient Information     Date Of Birth          1995        Visit Information        Provider Department      8/7/2017 2:00 PM Aiyana Melendez PA-C M [facility] Endocrinology        Care Instructions    A1c down to 10.1%- nice work!     Restart your sensor. This will keep you safe when you start your new job and help keep the sugars heading in the right direction.      To avoid lows in the night, will make the following changes:   New basal rates:   Mid- 0.65 (down from 0.7)  9am- 0.775 (no change)  Noon- 0.8 (no change)  8pm- 0.75 (no change)                  Follow-ups after your visit        Follow-up notes from your care team     Return in about 6 weeks (around 9/18/2017).      Your next 10 appointments already scheduled     Sep 25, 2017  2:00 PM CDT   (Arrive by 1:45 PM)   RETURN DIABETES with JOSE ANTONIO Seaman [facility] Endocrinology ([facility])    [address] [place] [id number] [phone]            Nov 13, 2017  3:00 PM CST   (Arrive by 2:45 PM)   NEW DIABETES with MD LEONEL Carr [facility] Endocrinology ([facility])    [address] [place] [id number] [phone]              Who to contact     Please call your clinic at [phone] to:    Ask questions about your health    Make or cancel appointments    Discuss your medicines    Learn about your test results    Speak to your doctor   If you have compliments or concerns about an experience at your clinic, or if you wish to file a complaint, please contact [facility] Patient Relations at [phone] or email us at [email or web address]         Additional Information About Your Visit        MyChart Information     [employer] gives you secure access to your electronic health record. If you see a primary  "care provider, you can also send messages to your care team and make appointments. If you have questions, please call your primary care clinic.  If you do not have a primary care provider, please call [phone] and they will assist you.      [employer] is an electronic gateway that provides easy, online access to your medical records. With [employer], you can request a clinic appointment, read your test results, renew a prescription or communicate with your care team.     To access your existing account, please contact your [facility] or call [phone] for assistance.        Care EveryWhere ID     This is your Care EveryWhere ID. This could be used by other organizations to access your [place] medical records  [id number]        Your Vitals Were     Pulse Height BMI (Body Mass Index)             88 1.575 m (5' 2\") 20.87 kg/m2          Blood Pressure from Last 3 Encounters:   08/07/17 106/69   06/26/17 109/74   05/10/17 110/73    Weight from Last 3 Encounters:   08/07/17 51.8 kg (114 lb 1.6 oz)   06/26/17 51.1 kg (112 lb 11.2 oz)   05/10/17 49.9 kg (110 lb)              Today, you had the following     No orders found for display       Primary Care Provider Office Phone # Fax #    Mamadou Martinez [phone]       [facility] [address]  [facility] [address]        Equal Access to Services     [facility] Hospital[facility] : Hadii aad ku hadasho Soomaali, waaxda luqadaha, qaybta kaalmada adeegyada, shasha saini . So [facility] [phone].    ATENCIÓN: Si habla español, tiene a mar disposición servicios gratuitos de asistencia lingüística. Llame al [phone].    We comply with applicable federal civil rights laws and [place] laws. We do not discriminate on the basis of race, color, national origin, age, disability sex, sexual orientation or gender identity.            Thank you!     Thank you for choosing [facility]  for " your care. Our goal is always to provide you with excellent care. Hearing back from our patients is one way we can continue to improve our services. Please take a few minutes to complete the written survey that you may receive in the mail after your visit with us. Thank you!             Your Updated Medication List - Protect others around you: Learn how to safely use, store and throw away your medicines at www.disposemymeds.org.          This list is accurate as of: 8/7/17  3:13 PM.  Always use your most recent med list.                   Brand Name Dispense Instructions for use Diagnosis    * blood glucose monitoring test strip    no brand specified     TEST 6-8 TIMES DAILY        * blood glucose monitoring test strip    MILA CONTOUR NEXT    200 each    1 strip by In Vitro route 3 times daily Use to test blood sugar 2 times daily or as directed.    Poorly controlled type 1 diabetes mellitus (H)       escitalopram 20 MG tablet    LEXAPRO     Take 20 mg by mouth        glucagon 1 MG kit     1 mg    Inject 1 mg into the muscle once for 1 dose INJECT SUBCUTANEOUSLY AS DIRECTED AS NEEDED    Poorly controlled type 1 diabetes mellitus (H)       insulin aspart 100 UNITS/ML injection    NovoLOG    4 vial    Use as directed in insulin pump up to 45 units daily    Poorly controlled type 1 diabetes mellitus (H)       ketone blood test Strp     10 each    As directed.    Poorly controlled type 1 diabetes mellitus (H)       Multiple vitamin  s Tabs      Take 1 tablet by mouth        ondansetron 4 MG ODT tab    ZOFRAN-ODT     Place 4 mg under the tongue as needed        SNAP INSULIN PUMP CONTROLLER Misc      As directed. Novalog:  Gives a base rate, boluses according to 1-3 times/day        * Notice:  This list has 2 medication(s) that are the same as other medications prescribed for you. Read the directions carefully, and ask your doctor or other care provider to review them with you.

## 2017-11-13 ENCOUNTER — OFFICE VISIT (OUTPATIENT)
Dept: ENDOCRINOLOGY | Facility: CLINIC | Age: 22
End: 2017-11-13

## 2017-11-13 VITALS
HEIGHT: 62 IN | HEART RATE: 102 BPM | SYSTOLIC BLOOD PRESSURE: 113 MMHG | WEIGHT: 114.4 LBS | DIASTOLIC BLOOD PRESSURE: 74 MMHG | BODY MASS INDEX: 21.05 KG/M2

## 2017-11-13 DIAGNOSIS — E10.8 TYPE 1 DIABETES MELLITUS WITH COMPLICATION (H): Primary | ICD-10-CM

## 2017-11-13 DIAGNOSIS — E11.43 GASTROPARESIS DUE TO DM (H): ICD-10-CM

## 2017-11-13 DIAGNOSIS — K31.84 GASTROPARESIS DUE TO DM (H): ICD-10-CM

## 2017-11-13 LAB — HBA1C MFR BLD: 9.9 % (ref 4.3–6)

## 2017-11-13 NOTE — LETTER
11/13/2017       RE: Ashley Gonzales  [address] [facility] [address]     Dear Colleague,    Thank you for referring your patient, Ashley Gonzales, to the [facility] ENDOCRINOLOGY at [facility]. Please see a copy of my visit note below.    Endocrinology and Diabetes Clinic    Subjective:   Ashley Gonzales is a 22 year old woman here for follow up of type 1 diabetes mellitus. She usually sees Aiyana Melendez, and this is her first visit with me.    She was diagnosed with type 1 diabetes as an infant. Her only complication is gastroparesis. She has struggled with glycemic control over the years, with a hemoglobin A1c typically 9-10% when she was a child and teenager. Her highest hemoglobin A1c to date was 13%, in 9/2016. She has been hospitalized for DKA in the past, most recently in 4/2017 when she presented with mild DKA associated with an episode of gastroenteritis.    She has been working closely with Aiyana Melendez and glycemic control has been improving. She started using a Dexcom CGM earlier this year, which had been very helpful. However, she does not like wearing the CGM. In particular, she doesn't like having another object on her stomach. For this reason she has not been wearing the Dexcom for several months.    She is using a Medtronic Paradigm Revel insulin pump with NovoLog insulin. Current pump settings:  Basal 0000 0.650   0900 0.775   1200 0.800   2000 0.750  1 unit/15 g carbohydrate (she usually does not use the Bolus Wizard and uses an experience based estimation instead)  1 unit/50 mg/dL correction   Target   Total daily insulin 32 +/- 7 units , 54% basal     She has been checking her blood glucose infrequently. Over the past month she has only checked 19 times. Her average glucose was 164 with range . There were a few episodes of mild hypoglycemia overnight, which she believes occurred when she over bolused for a meal. She reports symptomatically  "episodes of hypoglycemia which occur 2-3 times per month.    Medications:   Current Outpatient Prescriptions   Medication Sig Dispense Refill     MELATONIN PO Take 5 mg by mouth nightly as needed       OMEPRAZOLE PO Take 20 mg by mouth       glucagon 1 MG kit Inject 1 mg into the muscle once for 1 dose INJECT SUBCUTANEOUSLY AS DIRECTED AS NEEDED 1 mg 0     blood glucose monitoring (MILA CONTOUR NEXT) test strip 1 strip by In Vitro route 4 times daily Use to test blood sugar 4 times daily or as directed. 200 each 11     ketone blood test STRP As directed. 10 each 11     insulin aspart (NOVOLOG) 100 UNITS/ML injection Use as directed in insulin pump up to 45 units daily 4 vial 3     escitalopram (LEXAPRO) 20 MG tablet Take 20 mg by mouth       ondansetron (ZOFRAN-ODT) 4 MG ODT tab Place 4 mg under the tongue as needed        Multiple vitamin  s TABS Take 1 tablet by mouth       blood glucose monitoring (NO BRAND SPECIFIED) test strip TEST 6-8 TIMES DAILY       Insulin Pump Accessories (SNAP INSULIN PUMP CONTROLLER) MISC As directed. Novalog:  Gives a base rate, boluses according to 1-3 times/day       [DISCONTINUED] glucagon 1 MG kit Inject 1 mg into the muscle once for 1 dose INJECT SUBCUTANEOUSLY AS DIRECTED AS NEEDED 1 mg 3     Social History:  Ashley has completed school and is working for [employer], a job she enjoys very much. In addition, she is a .  Social History   Substance Use Topics     Smoking status: Never Smoker     Smokeless tobacco: Not on file     Alcohol use No     Family History:   Her maternal grandmother had hypothyroidism and rheumatoid arthritis. Hypertriglyceridemia runs in the family. An uncle has type 2 diabetes. Her mother also has type 2 diabetes, and her father has diabetes that has been called \"type 1.5\".    Review of Systems:   GENERAL: Negative  SKIN: Negative  HENT: Eye exams are up to date.   EYE: Negative  HEART: Negative  RESPIRATORY: Negative   GI: " "Negative  : She has regular monthly menses.  MSK: Negative  BLOOD/LYMPH: Negative  NEUROLOGIC: Negative   PSYCH: Negative    Physical Examination:  Blood pressure 113/74, pulse 102, height 5' 2\" (1.575 m), weight 114 lb 6.4 oz (51.9 kg).  Body mass index is 20.92 kg/(m^2).    Wt Readings from Last 4 Encounters:   11/13/17 114 lb 6.4 oz (51.9 kg)   08/07/17 114 lb 1.6 oz (51.8 kg)   06/26/17 112 lb 11.2 oz (51.1 kg)   05/10/17 110 lb (49.9 kg)     General: Well appearing, somewhat anxious young woman in no distress.   Eyes: EOMI. Sclerae and conjunctivae are clear.    HENT: No thyromegaly or mass.    Lymphatic: No cervical or supraclavicular lymphadenopathy.  Cardiovascular: RRR, with normal S1+S2 and no murmurs.   Respiratory: Lungs are clear to auscultation.   Gastrointestinal: Abdomen is soft, non tender, and non-distended.   Skin: No rash or lesions. No abdominal striae.    Extremities: No peripheral edema. Feet are warm and well perfused. No open lesions or ulcers.   Neurologic: No tremor with hands outstretched. 2+ patellar reflexes. Normal sensation to monofilament in the feet bilaterally.     Labs and Studies:   Component      Latest Ref Rng & Units 11/13/2017   Hemoglobin A1C      4.3 - 6 % 9.9 (A)     Assessment:  1. Type 1 diabetes mellitus, with stable glycemic control which is overall improved but is not yet optimal.   A. Gastroparesis.  2. Family history of hypertriglyceridemia.    Plan:   We discussed that to further improve glycemic control, Ashley will need to have more data about her blood glucose levels. She can either check fingersticks more often or resume wearing her CGM. She is interested in meeting with our diabetes educator to discuss CGM options, and to either troubleshoot her Dexcom in an effort to make it more comfortable to wear, or explore getting the new Ryanne CGM. We made a plan to schedule follow-up in 1 month, with appointments with diabetes education and Aiyana Melendez on the " same day. She will stop at the lab for routine diabetes screening labs prior to her appointments. In preparation for this visit, and to help with glycemic control, she will check her blood sugars more often and try wearing her Dexcom again. We did not change any of her insulin pump settings today.    Follow-up with me annually, and with Aiyana Melendez in between visits.     Nadia Stern MD   Diabetes and Endocrinology   [phone]

## 2017-11-13 NOTE — PROGRESS NOTES
Endocrinology and Diabetes Clinic    Subjective:   Ashley Gonzales is a 22 year old woman here for follow up of type 1 diabetes mellitus. She usually sees Aiyana Melendez, and this is her first visit with me.    She was diagnosed with type 1 diabetes as an infant. Her only complication is gastroparesis. She has struggled with glycemic control over the years, with a hemoglobin A1c typically 9-10% when she was a child and teenager. Her highest hemoglobin A1c to date was 13%, in 9/2016. She has been hospitalized for DKA in the past, most recently in 4/2017 when she presented with mild DKA associated with an episode of gastroenteritis.    She has been working closely with Aiyana Melendez and glycemic control has been improving. She started using a Dexcom CGM earlier this year, which had been very helpful. However, she does not like wearing the CGM. In particular, she doesn't like having another object on her stomach. For this reason she has not been wearing the Dexcom for several months.    She is using a Medtronic Paradigm Revel insulin pump with NovoLog insulin. Current pump settings:  Basal 0000 0.650   0900 0.775   1200 0.800   2000 0.750  1 unit/15 g carbohydrate (she usually does not use the Bolus Wizard and uses an experience based estimation instead)  1 unit/50 mg/dL correction   Target   Total daily insulin 32 +/- 7 units , 54% basal     She has been checking her blood glucose infrequently. Over the past month she has only checked 19 times. Her average glucose was 164 with range . There were a few episodes of mild hypoglycemia overnight, which she believes occurred when she over bolused for a meal. She reports symptomatically episodes of hypoglycemia which occur 2-3 times per month.    Medications:   Current Outpatient Prescriptions   Medication Sig Dispense Refill     MELATONIN PO Take 5 mg by mouth nightly as needed       OMEPRAZOLE PO Take 20 mg by mouth       glucagon 1 MG kit Inject 1 mg into the  "muscle once for 1 dose INJECT SUBCUTANEOUSLY AS DIRECTED AS NEEDED 1 mg 0     blood glucose monitoring (MILA CONTOUR NEXT) test strip 1 strip by In Vitro route 4 times daily Use to test blood sugar 4 times daily or as directed. 200 each 11     ketone blood test STRP As directed. 10 each 11     insulin aspart (NOVOLOG) 100 UNITS/ML injection Use as directed in insulin pump up to 45 units daily 4 vial 3     escitalopram (LEXAPRO) 20 MG tablet Take 20 mg by mouth       ondansetron (ZOFRAN-ODT) 4 MG ODT tab Place 4 mg under the tongue as needed        Multiple vitamin  s TABS Take 1 tablet by mouth       blood glucose monitoring (NO BRAND SPECIFIED) test strip TEST 6-8 TIMES DAILY       Insulin Pump Accessories (SNAP INSULIN PUMP CONTROLLER) MISC As directed. Novalog:  Gives a base rate, boluses according to 1-3 times/day       [DISCONTINUED] glucagon 1 MG kit Inject 1 mg into the muscle once for 1 dose INJECT SUBCUTANEOUSLY AS DIRECTED AS NEEDED 1 mg 3     Social History:  Ashley has completed school and is working for [employer], a job she enjoys very much. In addition, she is a .  Social History   Substance Use Topics     Smoking status: Never Smoker     Smokeless tobacco: Not on file     Alcohol use No     Family History:   Her maternal grandmother had hypothyroidism and rheumatoid arthritis. Hypertriglyceridemia runs in the family. An uncle has type 2 diabetes. Her mother also has type 2 diabetes, and her father has diabetes that has been called \"type 1.5\".    Review of Systems:   GENERAL: Negative  SKIN: Negative  HENT: Eye exams are up to date.   EYE: Negative  HEART: Negative  RESPIRATORY: Negative   GI: Negative  : She has regular monthly menses.  MSK: Negative  BLOOD/LYMPH: Negative  NEUROLOGIC: Negative   PSYCH: Negative    Physical Examination:  Blood pressure 113/74, pulse 102, height 5' 2\" (1.575 m), weight 114 lb 6.4 oz (51.9 kg).  Body mass index is 20.92 kg/(m^2).    Wt Readings " from Last 4 Encounters:   11/13/17 114 lb 6.4 oz (51.9 kg)   08/07/17 114 lb 1.6 oz (51.8 kg)   06/26/17 112 lb 11.2 oz (51.1 kg)   05/10/17 110 lb (49.9 kg)     General: Well appearing, somewhat anxious young woman in no distress.   Eyes: EOMI. Sclerae and conjunctivae are clear.    HENT: No thyromegaly or mass.    Lymphatic: No cervical or supraclavicular lymphadenopathy.  Cardiovascular: RRR, with normal S1+S2 and no murmurs.   Respiratory: Lungs are clear to auscultation.   Gastrointestinal: Abdomen is soft, non tender, and non-distended.   Skin: No rash or lesions. No abdominal striae.    Extremities: No peripheral edema. Feet are warm and well perfused. No open lesions or ulcers.   Neurologic: No tremor with hands outstretched. 2+ patellar reflexes. Normal sensation to monofilament in the feet bilaterally.     Labs and Studies:   Component      Latest Ref Rng & Units 11/13/2017   Hemoglobin A1C      4.3 - 6 % 9.9 (A)     Assessment:  1. Type 1 diabetes mellitus, with stable glycemic control which is overall improved but is not yet optimal.   A. Gastroparesis.  2. Family history of hypertriglyceridemia.    Plan:   We discussed that to further improve glycemic control, Ashley will need to have more data about her blood glucose levels. She can either check fingersticks more often or resume wearing her CGM. She is interested in meeting with our diabetes educator to discuss CGM options, and to either troubleshoot her Dexcom in an effort to make it more comfortable to wear, or explore getting the new Ryanne CGM. We made a plan to schedule follow-up in 1 month, with appointments with diabetes education and Aiyana Melendez on the same day. She will stop at the lab for routine diabetes screening labs prior to her appointments. In preparation for this visit, and to help with glycemic control, she will check her blood sugars more often and try wearing her Dexcom again. We did not change any of her insulin pump settings  today.    Follow-up with me annually, and with Aiyana Melendez in between visits.     Nadia Stern MD   Diabetes and Endocrinology   [phone]

## 2017-11-13 NOTE — NURSING NOTE
"Chief Complaint   Patient presents with     RECHECK     DIABETES TYPE 1 F/U        Initial /74 (BP Location: Right arm, Patient Position: Sitting, Cuff Size: Adult Regular)  Pulse 102  Ht 1.575 m (5' 2\")  Wt 51.9 kg (114 lb 6.4 oz)  BMI 20.92 kg/m2 Estimated body mass index is 20.92 kg/(m^2) as calculated from the following:    Height as of this encounter: 1.575 m (5' 2\").    Weight as of this encounter: 51.9 kg (114 lb 6.4 oz).  Medication Reconciliation: completecomplete    Performed A1C test - patient tolerated well.    Isis Shah, [facility]       "

## 2017-11-13 NOTE — MR AVS SNAPSHOT
After Visit Summary   11/13/2017    Ashley Gonzales    MRN: [id number]           Patient Information     Date Of Birth          1995        Visit Information        Provider Department      11/13/2017 3:00 PM Nadia Stern MD [facility] Endocrinology        Today's Diagnoses     Type 1 diabetes mellitus with complication (H)    -  1    Gastroparesis due to DM (H)           Follow-ups after your visit        Additional Services     DIABETES EDUCATOR REFERRAL       DIABETES SELF MANAGEMENT TRAINING (DSMT)      Your provider has referred you to Diabetes Education: [facility]: Diabetes Education - [facility] [phone] [email or web address]    A  will call you to make your appointment. If it has been more than 3 business days since your referral was placed, please call the above phone number to schedule.    Type of training and number of hours: Previous Diagnosis: Follow-up DSMT - 2 hours.    Medicare covers: 10 hours of initial DSMT in 12 month period from the time of first visit, plus 2 hours of follow-up DSMT annually, and additional hours as requested for insulin training.    Diabetes Type: Type 1             Diabetes Co-Morbidities: other gastroparesis               A1C Goal:  <7.0       A1C is: Lab Results       Component                Value               Date                       A1C                      12.2                01/11/2016              Diabetes Education Topics: Continuous Glucose Monitor: Personal CGM    Special Educational Needs Requiring Individual DSMT: None       MEDICAL NUTRITION THERAPY (MNT) for Diabetes    Medical Nutrition Therapy with a Registered Dietitian can be provided in coordination with Diabetes Self-Management Training to assist in achieving optimal diabetes management.    MNT Type and Hours: Do not initiate MNT at this time.                        Medicare will cover: 3 hours initial MNT in 12 month period after first visit, plus 2 hours of follow-up MNT annually    Please be aware that coverage of these services is subject to the terms and limitations of your health insurance plan.  Call member services at your health plan to determine Diabetes Self-Management Training (Codes  &amp; ) and Medical Nutrition Therapy (Codes 63762 & 86254) benefits and ask which blood glucose monitor brands are covered by your plan.  Please bring the following with you to your appointment:    (1)  List of current medications   (2)  List of Blood Glucose Monitor brands that are covered by your insurance plan  (3)  Blood Glucose Monitor and log book  (4)   Food records for the 3 days prior to your visit    The Certified Diabetes Educator may make diabetes medication adjustments per the CDE Protocol and Collaborative Practice Agreement.                  Follow-up notes from your care team     Return in about 4 weeks (around 12/11/2017).      Your next 10 appointments already scheduled     Dec 18, 2017  2:30 PM CST   LAB with  LAB   [facility] ([facility])    [address] [place] [address] [phone]           Please do not eat 10-12 hours before your appointment if you are coming in fasting for labs on lipids, cholesterol, or glucose (sugar). This does not apply to pregnant women. Water, hot tea and black coffee (with nothing added) are okay. Do not drink other fluids, diet soda or chew gum.            Dec 18, 2017  3:00 PM CST   (Arrive by 2:45 PM)   RETURN DIABETES with Aiyana Melendez PA-C   [facility] ([facility])    [address] [place] [id number] [phone]            Dec 18, 2017  4:00 PM CST   (Arrive by 3:45 PM)   Office Visit with Sadia Willingham RN   [facility] ([facility])    [address] [place] [address]  Se  3rd Floor  [facility] [address] [phone]           Bring a current list of meds and any records pertaining to this visit. For Physicals, please bring immunization records and any forms needing to be filled out. Please arrive 10 minutes early to complete paperwork.              Future tests that were ordered for you today     Open Future Orders        Priority Expected Expires Ordered    Vitamin D Deficiency Routine 12/11/2017 11/13/2018 11/13/2017    TSH with free T4 reflex Routine 12/11/2017 11/13/2018 11/13/2017    Lipid panel reflex to direct LDL Fasting Routine 12/11/2017 11/13/2018 11/13/2017    Hemoglobin A1c Routine 12/11/2017 11/13/2018 11/13/2017    CBC with platelets Routine 12/11/2017 11/13/2018 11/13/2017    Albumin Random Urine Quantitative with Creat Ratio Routine 12/11/2017 11/13/2018 11/13/2017    Basic metabolic panel Routine 12/11/2017 11/13/2018 11/13/2017            Who to contact     Please call your clinic at [phone] to:    Ask questions about your health    Make or cancel appointments    Discuss your medicines    Learn about your test results    Speak to your doctor   If you have compliments or concerns about an experience at your clinic, or if you wish to file a complaint, please contact [facility] Patient Relations at [phone] or email us at [email or web address]         Additional Information About Your Visit        [email or web address] Information     TriviaPad gives you secure access to your electronic health record. If you see a primary care provider, you can also send messages to your care team and make appointments. If you have questions, please call your primary care clinic.  If you do not have a primary care provider, please call [phone] and they will assist you.      TriviaPad is an electronic gateway that provides easy, online access to your medical records. With TriviaPad, you can request a clinic appointment, read your test results,  "renew a prescription or communicate with your care team.     To access your existing account, please contact your [facility] or call [phone] for assistance.        Care EveryWhere ID     This is your Care EveryWhere ID. This could be used by other organizations to access your [place] medical records  [id number]        Your Vitals Were     Pulse Height BMI (Body Mass Index)             102 1.575 m (5' 2\") 20.92 kg/m2          Blood Pressure from Last 3 Encounters:   11/13/17 113/74   08/07/17 106/69   06/26/17 109/74    Weight from Last 3 Encounters:   11/13/17 51.9 kg (114 lb 6.4 oz)   08/07/17 51.8 kg (114 lb 1.6 oz)   06/26/17 51.1 kg (112 lb 11.2 oz)              We Performed the Following     DIABETES EDUCATOR REFERRAL          Today's Medication Changes          These changes are accurate as of: 11/13/17  4:15 PM.  If you have any questions, ask your nurse or doctor.               These medicines have changed or have updated prescriptions.        Dose/Directions    * blood glucose monitoring test strip   Commonly known as:  no brand specified   This may have changed:  Another medication with the same name was changed. Make sure you understand how and when to take each.        TEST 6-8 TIMES DAILY   Refills:  0       * blood glucose monitoring test strip   Commonly known as:  MILA CONTOUR NEXT   This may have changed:    - when to take this  - additional instructions        Dose:  1 strip   1 strip by In Vitro route 4 times daily Use to test blood sugar 4 times daily or as directed.   Quantity:  200 each   Refills:  11       * Notice:  This list has 2 medication(s) that are the same as other medications prescribed for you. Read the directions carefully, and ask your doctor or other care provider to review them with you.         Where to get your medicines      These medications were sent to [facility] - [place] - [address]  [address] [facility] [address] "     Phone:  [phone]     blood glucose monitoring test strip    glucagon 1 MG kit                Primary Care Provider Office Phone # Fax #    Mamadou Martinez [phone]       [facility] [address]  [facility] [address]        Equal Access to Services     [facility] : Hadii aad ku hadasho Soomaali, waaxda luqadaha, qaybta kaalmada adeegyada, waxay idiin hayaan adeeg khtimbo la'aan . So [facility] [phone].    ATENCIÓN: Si habla español, tiene a mar disposición servicios gratuitos de asistencia lingüística. Llame al [phone].    We comply with applicable federal civil rights laws and [place] laws. We do not discriminate on the basis of race, color, national origin, age, disability, sex, sexual orientation, or gender identity.            Thank you!     Thank you for choosing [facility] ENDOCRINOLOGY  for your care. Our goal is always to provide you with excellent care. Hearing back from our patients is one way we can continue to improve our services. Please take a few minutes to complete the written survey that you may receive in the mail after your visit with us. Thank you!             Your Updated Medication List - Protect others around you: Learn how to safely use, store and throw away your medicines at www.disposemymeds.org.          This list is accurate as of: 11/13/17  4:15 PM.  Always use your most recent med list.                   Brand Name Dispense Instructions for use Diagnosis    * blood glucose monitoring test strip    no brand specified     TEST 6-8 TIMES DAILY        * blood glucose monitoring test strip    MILA CONTOUR NEXT    200 each    1 strip by In Vitro route 4 times daily Use to test blood sugar 4 times daily or as directed.        escitalopram 20 MG tablet    LEXAPRO     Take 20 mg by mouth        glucagon 1 MG kit     1 mg    Inject 1 mg into the muscle once for 1 dose INJECT SUBCUTANEOUSLY AS DIRECTED AS NEEDED        insulin aspart 100  UNITS/ML injection    NovoLOG    4 vial    Use as directed in insulin pump up to 45 units daily    Poorly controlled type 1 diabetes mellitus (H)       ketone blood test Strp     10 each    As directed.    Poorly controlled type 1 diabetes mellitus (H)       MELATONIN PO      Take 5 mg by mouth nightly as needed        Multiple vitamin  s Tabs      Take 1 tablet by mouth        OMEPRAZOLE PO      Take 20 mg by mouth        ondansetron 4 MG ODT tab    ZOFRAN-ODT     Place 4 mg under the tongue as needed        SNAP INSULIN PUMP CONTROLLER Misc      As directed. Novalog:  Gives a base rate, boluses according to 1-3 times/day        * Notice:  This list has 2 medication(s) that are the same as other medications prescribed for you. Read the directions carefully, and ask your doctor or other care provider to review them with you.

## 2018-01-06 ENCOUNTER — HEALTH MAINTENANCE LETTER (OUTPATIENT)
Age: 23
End: 2018-01-06

## 2018-02-26 DIAGNOSIS — E10.9 TYPE 1 DIABETES MELLITUS (H): Primary | ICD-10-CM

## 2018-04-16 ENCOUNTER — OFFICE VISIT (OUTPATIENT)
Dept: ENDOCRINOLOGY | Facility: CLINIC | Age: 23
End: 2018-04-16
Payer: COMMERCIAL

## 2018-04-16 VITALS
HEART RATE: 88 BPM | SYSTOLIC BLOOD PRESSURE: 107 MMHG | DIASTOLIC BLOOD PRESSURE: 73 MMHG | WEIGHT: 113.2 LBS | HEIGHT: 62 IN | BODY MASS INDEX: 20.83 KG/M2

## 2018-04-16 DIAGNOSIS — E10.65 POORLY CONTROLLED TYPE 1 DIABETES MELLITUS (H): Primary | ICD-10-CM

## 2018-04-16 LAB — HBA1C MFR BLD: 8.8 % (ref 4.3–6)

## 2018-04-16 ASSESSMENT — PAIN SCALES - GENERAL: PAINLEVEL: NO PAIN (0)

## 2018-04-16 NOTE — PATIENT INSTRUCTIONS
Continue your good work with bolusing.     Work on taking your insulin 15 minutes before eating.      Consider visit with Nhung Rivera in health psychology.     A1c 8.8% today!    Work in exercise into your daily regimen.

## 2018-04-16 NOTE — MR AVS SNAPSHOT
After Visit Summary   4/16/2018    Ashley Gonzales    MRN: [id number]           Patient Information     Date Of Birth          1995        Visit Information        Provider Department      4/16/2018 12:00 PM Aiyana Melendez PA-C [facility]        Care Instructions    Continue your good work with bolusing.     Work on taking your insulin 15 minutes before eating.      Consider visit with Nhung Rivera in health psychology.     A1c 8.8% today!    Work in exercise into your daily regimen.               Follow-ups after your visit        Your next 10 appointments already scheduled     Jul 16, 2018  1:00 PM CDT   (Arrive by 12:45 PM)   RETURN DIABETES with JOSE ANTONIO [facility] ([facility])    [address] [address] [phone]              Who to contact     Please call your clinic at [phone] to:    Ask questions about your health    Make or cancel appointments    Discuss your medicines    Learn about your test results    Speak to your doctor            Additional Information About Your Visit        Vigilix[employer]Airbnb Information     Pivotstream gives you secure access to your electronic health record. If you see a primary care provider, you can also send messages to your care team and make appointments. If you have questions, please call your primary care clinic.  If you do not have a primary care provider, please call [phone] and they will assist you.      Pivotstream is an electronic gateway that provides easy, online access to your medical records. With Pivotstream, you can request a clinic appointment, read your test results, renew a prescription or communicate with your care team.     To access your existing account, please contact your [facility] or call [phone] for assistance.        Care EveryWhere ID     This is your Care EveryWhere ID. This could be used by other  "organizations to access your [place] medical records  [id number]        Your Vitals Were     Pulse Height BMI (Body Mass Index)             88 1.575 m (5' 2.01\") 20.7 kg/m2          Blood Pressure from Last 3 Encounters:   04/16/18 107/73   11/13/17 113/74   08/07/17 106/69    Weight from Last 3 Encounters:   04/16/18 51.3 kg (113 lb 3.2 oz)   11/13/17 51.9 kg (114 lb 6.4 oz)   08/07/17 51.8 kg (114 lb 1.6 oz)              Today, you had the following     No orders found for display       Primary Care Provider Office Phone # Fax #    Mamadou Martinez [phone]       [facility] [address]  [facility] [address]        Equal Access to Services     [facility]: Hadii aad ku hadasho Soomaali, waaxda luqadaha, qaybta kaalmada adeegyada, waxay deepakin hayaan bill saini . So [facility] [phone].    ATENCIÓN: Si habla español, tiene a mar disposición servicios gratuitos de asistencia lingüística. Llame al [phone].    We comply with applicable federal civil rights laws and [place] laws. We do not discriminate on the basis of race, color, national origin, age, disability, sex, sexual orientation, or gender identity.            Thank you!     Thank you for choosing [facility] ENDOCRINOLOGY  for your care. Our goal is always to provide you with excellent care. Hearing back from our patients is one way we can continue to improve our services. Please take a few minutes to complete the written survey that you may receive in the mail after your visit with us. Thank you!             Your Updated Medication List - Protect others around you: Learn how to safely use, store and throw away your medicines at www.disposemymeds.org.          This list is accurate as of 4/16/18  1:08 PM.  Always use your most recent med list.                   Brand Name Dispense Instructions for use Diagnosis    * blood glucose monitoring test strip    no brand specified     TEST 6-8 TIMES DAILY     "    * blood glucose monitoring test strip    MILA CONTOUR NEXT    200 each    1 strip by In Vitro route 4 times daily Use to test blood sugar 4 times daily or as directed.        escitalopram 20 MG tablet    LEXAPRO     Take 20 mg by mouth        glucagon 1 MG kit     1 mg    Inject 1 mg into the muscle once for 1 dose INJECT SUBCUTANEOUSLY AS DIRECTED AS NEEDED        insulin lispro 100 UNIT/ML injection    humaLOG    40 mL    Use as directed in insulin pump up to 45 units daily    Type 1 diabetes mellitus (H)       ketone blood test Strp     10 each    As directed.    Poorly controlled type 1 diabetes mellitus (H)       MELATONIN PO      Take 5 mg by mouth nightly as needed        Multiple vitamin  s Tabs      Take 1 tablet by mouth        NovoLOG VIAL 100 UNITS/ML injection   Generic drug:  insulin aspart           OMEPRAZOLE PO      Take 20 mg by mouth        ondansetron 4 MG ODT tab    ZOFRAN-ODT     Place 4 mg under the tongue as needed        SNAP INSULIN PUMP CONTROLLER Misc      As directed. Novalog:  Gives a base rate, boluses according to 1-3 times/day        * Notice:  This list has 2 medication(s) that are the same as other medications prescribed for you. Read the directions carefully, and ask your doctor or other care provider to review them with you.

## 2018-04-16 NOTE — NURSING NOTE
"Chief Complaint   Patient presents with     RECHECK     DIABETES TYPE 1       Initial /73  Pulse 88  Ht 1.575 m (5' 2.01\")  Wt 51.3 kg (113 lb 3.2 oz)  BMI 20.7 kg/m2 Estimated body mass index is 20.7 kg/(m^2) as calculated from the following:    Height as of this encounter: 1.575 m (5' 2.01\").    Weight as of this encounter: 51.3 kg (113 lb 3.2 oz).  Medication Reconciliation: complete    "

## 2018-04-16 NOTE — LETTER
2018       RE: Ashley Gonzales  [address] [facility] [address]     Dear Colleague,    Thank you for referring your patient, Ashley Gonzales, to the [facility] ENDOCRINOLOG[facility] at [facility]. Please see a copy of my visit note below.    HPI:   Ashley is a 21 yo woman here with her mom for follow up of type 1 diabetes, diagnosed at age 2.  This has been poorly controlled for many years, however she has been working closely with our clinic to tighten her control over the past 1.5 years.  She has started testing her sugars more, has been covering her food with bolus insulin and she is feeling like her blood sugars are better.  In fact, she is waking in the night with hypoglycemia a couple times a week.  She is not physically active and her job at the [employer] is on hold until the summer.    Ashley has had some discomfort with her Dexcom sensor so she has not been wearing it.  She has been testing her blood sugar 1-2 times a day with an overall average of 169 mg/dL this month.   She boluses about 2-3 times a day.       Ashley wears a Medtronic paradigm revel pump with basal rates as follows:     Mid- 0.65  9am- 0.775  Noon- 0.8  8pm-  0.75    IC ratio- 1:15g (although she does not usually count carbs)  Sensitivity- 50  Target glucose-   Active insulin time- 4 hours    Average total daily insulin dose- 36 Units (48%basal, 52%bolus)    Her gastroparesis symptoms are much improved, but she has been down because her cat  (her best friend).  She also had an appendectomy last week and her appetite has been down.     ROS  GENERAL: weight stable.  No fevers, chills, malaise, night sweats.   HEENT: no dysphagia, diplopia, neck pain or tenderness, dry/scratchy eyes, URI, cough, sinus drainage, tinnitus, sinus pressure  CV: no chest pain, pressure.  +palpitations occasionally.  Feels anxious.   LUNGS: no SOB, CARRASCO, cough, sputum production, wheezing   GI: no diarrhea,  constipation, abdominal pain  EXTREMITIES: no rashes, ulcers, edema  NEUROLOGY: no changes in vision, tingling or numbness in hands or feet.   MSK: no muscle aches or pains, weakness  PSYCH: anxiety a bit better    Past Medical History:   Diagnosis Date     Diabetes (H)     Diagnosed at 18 months old, currently with insulin pump       No past surgical history on file.    Family History   Problem Relation Age of Onset     DIABETES Mother      Hyperlipidemia Mother      MENTAL ILLNESS Mother      DIABETES Father      MENTAL ILLNESS Father      DIABETES Maternal Grandfather      Hypertension Maternal Grandfather      Hyperlipidemia Maternal Grandfather      Breast Cancer Paternal Grandmother      CEREBROVASCULAR DISEASE Paternal Grandfather      Family History:  Mother-Type 2 DM  Father-Type 2 DM  Great grand aunt-DM type 2  Uncles with Type 2 DM   No thyroid disease or cancers.     Social History     Social History     Marital Status: Single     Spouse Name: N/A     Number of Children: N/A     Years of Education: N/A     Social History Main Topics     Smoking status: Never Smoker      Smokeless tobacco: None     Alcohol Use: No     Drug Use: No     Sexual Activity: Not Asked     Other Topics Concern     None     Social History Narrative   Social Hx: Ashley is single, lives at home with her parents and older brother.  She is very interested in photography and was enrolled at the [employer], but stopped going to classes due to her health issues.   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    Current Outpatient Prescriptions   Medication     insulin aspart (NOVOLOG VIAL) 100 UNITS/ML injection     MELATONIN PO     OMEPRAZOLE PO     blood glucose monitoring (MILA CONTOUR NEXT) test strip     ketone blood test STRP     escitalopram (LEXAPRO) 20 MG tablet      "ondansetron (ZOFRAN-ODT) 4 MG ODT tab     Multiple vitamin  s TABS     blood glucose monitoring (NO BRAND SPECIFIED) test strip     Insulin Pump Accessories (SNAP INSULIN PUMP CONTROLLER) MISC     insulin lispro (HUMALOG) 100 UNIT/ML injection     glucagon 1 MG kit     No current facility-administered medications for this visit.           Allergies   Allergen Reactions     No Clinical Screening - See Comments Hives and Rash     straw       Physical Exam  /73  Pulse 88  Ht 1.575 m (5' 2.01\")  Wt 51.3 kg (113 lb 3.2 oz)  BMI 20.7 kg/m2  GENERAL:  Alert and oriented X3, NAD, well dressed, answering questions appropriately, appears stated age.  EXTREMITIES: no edema, +pulses, no rashes, no lesions    RESULTS  Lab Results   Component Value Date    A1C 12.2 01/11/2016       TSH   Date Value Ref Range Status   10/31/2016 1.63 0.40 - 4.00 mU/L Final       Creatinine   Date Value Ref Range Status   10/31/2016 0.63 0.50 - 1.00 mg/dL Final   ]    No results found for: ALBUMIN]    No results found for: ALT]    No results for input(s): CHOL, HDL, LDL, TRIG, CHOLHDLRATIO in the last 25831 hours.    No results found for: B12]    Assessment/Plan:     1.  Type 1 diabetes-  Overall, control is improving.  A1c is down to 8.8%, the lowest it has been in years.  She stopped wearing her sensor as it was irritating her.  Will review with diabetes educator to trouble shoot.  She is very much interested in the G6 sensor which does not require calibration.  Also might be interested in different insulin pump.  Recommended she meet with DM education to review options, particularly could benefit from the 670g for glucose stability.  Will schedule.  For now, will make the following changes (instructions given to patient):         Will make the following changes:   New basal rates:   Mid- 0.6 (down from 0.65)  9am- 0.75 (down from 0.775)  Noon- 0.8 (no change)  8pm-  0.75 (no change)    Anticipate we may need to tighten IC ratio in the " future, but for now, the main focus is just covering whatever she eats.  She is not currently entering carbs, but will need to start doing so. Much encouragement given for so much improvement.     2.  Risk factors-     Retinopathy:  No.  Had eye exam within last year.   Nephropathy:  BP well controlled. Heart rate a bit high, but lower than in the past.  TSH ok.  No microalbuminuria.  Creatinine is stable.   Neuropathy: No.    Feet: OK, no ulcers.   Lipids: Need labs  Sleep disturbance- has trouble falling and staying asleep.  This seems to be related to worries keeping her up a night/anxiety.  I have suggested her seeing a health psychologist, but she is hesitant to establish with someone new.  She will consider.  Will revisit in the future, and in the meantime, will f/u with PCP.  Also suggested more physical activity, yoga, avoiding caffeine, etc.     3.  F/U in 3 months with me, in 6 months with Dr. Stern, sooner with concerns or hypoglycemia.     I spent 30 minutes with this patient face to face and explained the conditions and plans (more than 50% of time was counseling/coordination of care, diabetes management, follow up plan for worsening hyper and hypoglycemia) . The patient understood and is satisfied with today's visit.       Aiyana Melendez PA-C, MPAS   [facility]  Department of Medicine  Division of Endocrinology and Diabetes

## 2018-04-16 NOTE — PROGRESS NOTES
HPI:   Ashley is a 21 yo woman here with her mom for follow up of type 1 diabetes, diagnosed at age 2.  This has been poorly controlled for many years, however she has been working closely with our clinic to tighten her control over the past 1.5 years.  She has started testing her sugars more, has been covering her food with bolus insulin and she is feeling like her blood sugars are better.  In fact, she is waking in the night with hypoglycemia a couple times a week.  She is not physically active and her job at the [employer] is on hold until the summer.    Ashley has had some discomfort with her Dexcom sensor so she has not been wearing it.  She has been testing her blood sugar 1-2 times a day with an overall average of 169 mg/dL this month.   She boluses about 2-3 times a day.       Ashley wears a Medtronic paradigm revel pump with basal rates as follows:     Mid- 0.65  9am- 0.775  Noon- 0.8  8pm-  0.75    IC ratio- 1:15g (although she does not usually count carbs)  Sensitivity- 50  Target glucose-   Active insulin time- 4 hours    Average total daily insulin dose- 36 Units (48%basal, 52%bolus)    Her gastroparesis symptoms are much improved, but she has been down because her cat  (her best friend).  She also had an appendectomy last week and her appetite has been down.     ROS  GENERAL: weight stable.  No fevers, chills, malaise, night sweats.   HEENT: no dysphagia, diplopia, neck pain or tenderness, dry/scratchy eyes, URI, cough, sinus drainage, tinnitus, sinus pressure  CV: no chest pain, pressure.  +palpitations occasionally.  Feels anxious.   LUNGS: no SOB, CARRASCO, cough, sputum production, wheezing   GI: no diarrhea, constipation, abdominal pain  EXTREMITIES: no rashes, ulcers, edema  NEUROLOGY: no changes in vision, tingling or numbness in hands or feet.   MSK: no muscle aches or pains, weakness  PSYCH: anxiety a bit better    Past Medical History:   Diagnosis Date     Diabetes (H)      Diagnosed at 18 months old, currently with insulin pump       No past surgical history on file.    Family History   Problem Relation Age of Onset     DIABETES Mother      Hyperlipidemia Mother      MENTAL ILLNESS Mother      DIABETES Father      MENTAL ILLNESS Father      DIABETES Maternal Grandfather      Hypertension Maternal Grandfather      Hyperlipidemia Maternal Grandfather      Breast Cancer Paternal Grandmother      CEREBROVASCULAR DISEASE Paternal Grandfather      Family History:  Mother-Type 2 DM  Father-Type 2 DM  Great grand aunt-DM type 2  Uncles with Type 2 DM   No thyroid disease or cancers.     Social History     Social History     Marital Status: Single     Spouse Name: N/A     Number of Children: N/A     Years of Education: N/A     Social History Main Topics     Smoking status: Never Smoker      Smokeless tobacco: None     Alcohol Use: No     Drug Use: No     Sexual Activity: Not Asked     Other Topics Concern     None     Social History Narrative   Social Hx: Ashley is single, lives at home with her parents and older brother.  She is very interested in photography and was enrolled at the [employer], but stopped going to classes due to her health issues.   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    Current Outpatient Prescriptions   Medication     insulin aspart (NOVOLOG VIAL) 100 UNITS/ML injection     MELATONIN PO     OMEPRAZOLE PO     blood glucose monitoring (MILA CONTOUR NEXT) test strip     ketone blood test STRP     escitalopram (LEXAPRO) 20 MG tablet     ondansetron (ZOFRAN-ODT) 4 MG ODT tab     Multiple vitamin  s TABS     blood glucose monitoring (NO BRAND SPECIFIED) test strip     Insulin Pump Accessories (SNAP INSULIN PUMP CONTROLLER) MISC     insulin lispro (HUMALOG) 100 UNIT/ML injection     glucagon 1 MG kit     No  "current facility-administered medications for this visit.           Allergies   Allergen Reactions     No Clinical Screening - See Comments Hives and Rash     straw       Physical Exam  /73  Pulse 88  Ht 1.575 m (5' 2.01\")  Wt 51.3 kg (113 lb 3.2 oz)  BMI 20.7 kg/m2  GENERAL:  Alert and oriented X3, NAD, well dressed, answering questions appropriately, appears stated age.  EXTREMITIES: no edema, +pulses, no rashes, no lesions    RESULTS  Lab Results   Component Value Date    A1C 12.2 01/11/2016       TSH   Date Value Ref Range Status   10/31/2016 1.63 0.40 - 4.00 mU/L Final       Creatinine   Date Value Ref Range Status   10/31/2016 0.63 0.50 - 1.00 mg/dL Final   ]    No results found for: ALBUMIN]    No results found for: ALT]    No results for input(s): CHOL, HDL, LDL, TRIG, CHOLHDLRATIO in the last 48723 hours.    No results found for: B12]    Assessment/Plan:     1.  Type 1 diabetes-  Overall, control is improving.  A1c is down to 8.8%, the lowest it has been in years.  She stopped wearing her sensor as it was irritating her.  Will review with diabetes educator to trouble shoot.  She is very much interested in the G6 sensor which does not require calibration.  Also might be interested in different insulin pump.  Recommended she meet with DM education to review options, particularly could benefit from the 670g for glucose stability.  Will schedule.  For now, will make the following changes (instructions given to patient):         Will make the following changes:   New basal rates:   Mid- 0.6 (down from 0.65)  9am- 0.75 (down from 0.775)  Noon- 0.8 (no change)  8pm-  0.75 (no change)    Anticipate we may need to tighten IC ratio in the future, but for now, the main focus is just covering whatever she eats.  She is not currently entering carbs, but will need to start doing so. Much encouragement given for so much improvement.     2.  Risk factors-     Retinopathy:  No.  Had eye exam within last year. "   Nephropathy:  BP well controlled. Heart rate a bit high, but lower than in the past.  TSH ok.  No microalbuminuria.  Creatinine is stable.   Neuropathy: No.    Feet: OK, no ulcers.   Lipids: Need labs  Sleep disturbance- has trouble falling and staying asleep.  This seems to be related to worries keeping her up a night/anxiety.  I have suggested her seeing a health psychologist, but she is hesitant to establish with someone new.  She will consider.  Will revisit in the future, and in the meantime, will f/u with PCP.  Also suggested more physical activity, yoga, avoiding caffeine, etc.     3.  F/U in 3 months with me, in 6 months with Dr. Stern, sooner with concerns or hypoglycemia.     I spent 30 minutes with this patient face to face and explained the conditions and plans (more than 50% of time was counseling/coordination of care, diabetes management, follow up plan for worsening hyper and hypoglycemia) . The patient understood and is satisfied with today's visit.       Aiyana Melendez PA-C, MPAS   [facility]  Department of Medicine  Division of Endocrinology and Diabetes

## 2018-05-25 ENCOUNTER — MEDICAL CORRESPONDENCE (OUTPATIENT)
Dept: HEALTH INFORMATION MANAGEMENT | Facility: CLINIC | Age: 23
End: 2018-05-25

## 2018-07-26 DIAGNOSIS — E10.9 DIABETES MELLITUS TYPE 1 (H): Primary | ICD-10-CM

## 2018-08-03 ENCOUNTER — TELEPHONE (OUTPATIENT)
Dept: ENDOCRINOLOGY | Facility: CLINIC | Age: 23
End: 2018-08-03

## 2018-08-03 NOTE — TELEPHONE ENCOUNTER
Ashley is requesting to  replace the Admelog with novolog  ( needing a PA) . She has used the Admelog for one week stating her BS are higher wanting to change.  I left her a message to call  with  3-4 days of BS and pump setting clarification. She needs to have  used the Admelog for one month and often a dose increase is needed to  Control the blood sugars. I gave her both my number and on call after hours to report BS  to get a dose change fist.

## 2018-08-22 NOTE — TELEPHONE ENCOUNTER
"I called Ashley and mom to get BS data but they both refused \"They are all over the place\" Ashley started to say \" I don't really c.....\" then mom interrupted.   Ashley was also saying  \"They were out of control but then good again\"   But mom again interrupted with  we want the Novolog back . Insurance will need to know her ranges that shows  the  admelog is not working . Mom feels you know ashley and will just make the change. I  Insurance  are not accepting  Uncontrolled BS without dose adjustments. If they cannot provide proof  with BS data or attempt any changes in the Admelog dose it may not be approved. I don't know if she has used Humalog yet either . I see no allergy listed for it. She states she started the Admelog 8/1 and insurance again  Notified us that she needs to be on it 30 days  With adjustments before they will consider a PA approval. Mom demands that you call her to discuss. We cannot falsify  Information for PA purpose.Please call Mom .   "

## 2018-08-22 NOTE — TELEPHONE ENCOUNTER
[facility] Call Center    Phone Message    May a detailed message be left on voicemail: yes    Reason for Call: Other: Sadia is requesting that she speak directly with Aiyana Melendez to discuss the change in insulin.  Ashley has been struggling with the new medication, which was a change per the insurance.  Please contact Sadia directly to discuss.     Action Taken: Message routed to:  [facility] ([facility] sonja moran

## 2018-08-23 NOTE — TELEPHONE ENCOUNTER
I called and spoke with Ashley and her mother.  Sugars have been dropping too much when she has been active since starting the new Admelog insulin in her pump.  Occasionally under 70.  Advised her to do a temp basal of 80% with activity (i.e. Working at the [employer]) and I will add her onto my clinic on 9/10 at 3pm to review patterns in detail.  Will also schedule her in diabetes education to look at sensors, particularly the G6 dexcom and pump options. They agreed with the plan.     Schedulers- please schedule with myself and DM education on the same day, 9/10.  If timing doesn't work, please contact patient.     Thanks!    ARTURO Galarza

## 2018-08-28 ENCOUNTER — TELEPHONE (OUTPATIENT)
Dept: ENDOCRINOLOGY | Facility: CLINIC | Age: 23
End: 2018-08-28

## 2018-08-28 NOTE — TELEPHONE ENCOUNTER
----- Message from Krista Linda RN sent at 8/23/2018  4:08 PM CDT -----  Regarding: schedule  Schedulers- please schedule with myself and DM education on the same day, 9/10.  If timing doesn't work, please contact patient.      Thanks!     JEYSON GalarzaC

## 2018-09-10 ENCOUNTER — OFFICE VISIT (OUTPATIENT)
Dept: ENDOCRINOLOGY | Facility: CLINIC | Age: 23
End: 2018-09-10
Payer: COMMERCIAL

## 2018-09-10 ENCOUNTER — MEDICAL CORRESPONDENCE (OUTPATIENT)
Dept: HEALTH INFORMATION MANAGEMENT | Facility: CLINIC | Age: 23
End: 2018-09-10

## 2018-09-10 ENCOUNTER — ALLIED HEALTH/NURSE VISIT (OUTPATIENT)
Dept: EDUCATION SERVICES | Facility: CLINIC | Age: 23
End: 2018-09-10
Payer: COMMERCIAL

## 2018-09-10 VITALS
SYSTOLIC BLOOD PRESSURE: 116 MMHG | BODY MASS INDEX: 21.75 KG/M2 | HEIGHT: 62 IN | WEIGHT: 118.2 LBS | DIASTOLIC BLOOD PRESSURE: 71 MMHG | HEART RATE: 74 BPM

## 2018-09-10 DIAGNOSIS — E10.65 POORLY CONTROLLED TYPE 1 DIABETES MELLITUS (H): Primary | ICD-10-CM

## 2018-09-10 DIAGNOSIS — E10.65 TYPE 1 DIABETES MELLITUS WITH HYPERGLYCEMIA (H): Primary | ICD-10-CM

## 2018-09-10 LAB — HBA1C MFR BLD: 10.2 % (ref 4.3–6)

## 2018-09-10 ASSESSMENT — PAIN SCALES - GENERAL: PAINLEVEL: NO PAIN (0)

## 2018-09-10 NOTE — PATIENT INSTRUCTIONS
1.  Expect a call from [employer] about the T:Slim X2 insulin pump.  Be sure to answer your phone or e-mail.    2.  If you decide to go ahead with it please let us know.  There is paperwork for us to do on our end.  We will schedule your pump start at that time.    3.  Use the Ryanne sensor while you are waiting for the DexCom.    Sadia Willingham RN,CDE  [address] [address]  Phone: [phone]  [email or web address]        
Private Vehicle

## 2018-09-10 NOTE — MR AVS SNAPSHOT
After Visit Summary   9/10/2018    Ashley Gonzales    MRN: [id number]           Patient Information     Date Of Birth          1995        Visit Information        Provider Department      9/10/2018 3:00 PM Aiyana Melendez PA-C [facility]        Today's Diagnoses     Poorly controlled type 1 diabetes mellitus (H)    -  1      Care Instructions    Reduce basal rate to 0.5 units x 24 hours.  This is a big change.  Please let me know if you continue going low of if you are continuously high.  Please check in with me again by Thursday.     Change your insulin:carb ratio to 1:20g (was 1:15g)    Enter all carbs.  Use calorie caridad barbara on your phone to calculate carbs.     Focus on bedtime corrections.      Get on carelink and share your info.      Send me a [employer] message and give me your username and password    Once you are on the Dexcom G6, please share your info.      [email or web address]    Keep up the great work and stay in touch!    Aiyana                Follow-ups after your visit        Your next 10 appointments already scheduled     Oct 15, 2018  1:00 PM CDT   (Arrive by 12:45 PM)   RETURN DIABETES with JOSE ANTONIO [facility] ([facility])    [address] [place] [id number] [phone]              Who to contact     Please call your clinic at [phone] to:    Ask questions about your health    Make or cancel appointments    Discuss your medicines    Learn about your test results    Speak to your doctor            Additional Information About Your Visit        MyChart Information     [employer] gives you secure access to your electronic health record. If you see a primary care provider, you can also send messages to your care team and make appointments. If you have questions, please call your primary care clinic.  If you do not have a primary care provider, please call [phone] and they will assist  "you.      [employer] is an electronic gateway that provides easy, online access to your medical records. With [employer], you can request a clinic appointment, read your test results, renew a prescription or communicate with your care team.     To access your existing account, please contact your [facility] or call [phone] for assistance.        Care EveryWhere ID     This is your Care EveryWhere ID. This could be used by other organizations to access your [facility] medical records  [id number]        Your Vitals Were     Pulse Height BMI (Body Mass Index)             74 1.575 m (5' 2\") 21.62 kg/m2          Blood Pressure from Last 3 Encounters:   09/10/18 116/71   04/16/18 107/73   11/13/17 113/74    Weight from Last 3 Encounters:   09/10/18 53.6 kg (118 lb 3.2 oz)   04/16/18 51.3 kg (113 lb 3.2 oz)   11/13/17 51.9 kg (114 lb 6.4 oz)              We Performed the Following     Hemoglobin A1c POCT        Primary Care Provider Office Phone # Fax #    Mamadou Martienz [phone]       [facility] [address] [facility] [address]        Equal Access to Services     REKHA PIERRE : Hadii aad ku hadasho Soomaali, waaxda luqadaha, qaybta kaalmada adeegyada, waxay idiin hayaan adeeg kharatamika la'neli . So [facility] [phone].    ATENCIÓN: Si habla español, tiene a mar disposición servicios gratuitos de asistencia lingüística. Llame al [phone].    We comply with applicable federal civil rights laws and [place] laws. We do not discriminate on the basis of race, color, national origin, age, disability, sex, sexual orientation, or gender identity.            Thank you!     Thank you for choosing [facility]  for your care. Our goal is always to provide you with excellent care. Hearing back from our patients is one way we can continue to improve our services. Please take a few minutes to complete the written survey that you may receive in the mail " after your visit with us. Thank you!             Your Updated Medication List - Protect others around you: Learn how to safely use, store and throw away your medicines at www.disposemymeds.org.          This list is accurate as of 9/10/18  4:47 PM.  Always use your most recent med list.                   Brand Name Dispense Instructions for use Diagnosis    * blood glucose monitoring test strip    no brand specified     TEST 6-8 TIMES DAILY        * blood glucose monitoring test strip    MILA CONTOUR NEXT    200 each    1 strip by In Vitro route 4 times daily Use to test blood sugar 4 times daily or as directed.        escitalopram 20 MG tablet    LEXAPRO     Take 20 mg by mouth        glucagon 1 MG kit     1 mg    Inject 1 mg into the muscle once for 1 dose INJECT SUBCUTANEOUSLY AS DIRECTED AS NEEDED        * insulin lispro 100 UNIT/ML injection    humaLOG    40 mL    Use as directed in insulin pump up to 45 units daily    Type 1 diabetes mellitus (H)       * insulin lispro 100 UNIT/ML injection    ADMELOG    45 mL    Inject subcu via pump approx 45units daily    Diabetes mellitus type 1 (H)       ketone blood test Strp     10 each    As directed.    Poorly controlled type 1 diabetes mellitus (H)       MELATONIN PO      Take 5 mg by mouth nightly as needed        Multiple vitamin  s Tabs      Take 1 tablet by mouth        OMEPRAZOLE PO      Take 20 mg by mouth        ondansetron 4 MG ODT tab    ZOFRAN-ODT     Place 4 mg under the tongue as needed        SNAP INSULIN PUMP CONTROLLER Misc      As directed. Novalog:  Gives a base rate, boluses according to 1-3 times/day        * Notice:  This list has 4 medication(s) that are the same as other medications prescribed for you. Read the directions carefully, and ask your doctor or other care provider to review them with you.

## 2018-09-10 NOTE — LETTER
9/10/2018       RE: Ashley Gonzales  [address] [facility] [address]     Dear Colleague,    Thank you for referring your patient, Ashley Gonzales, to the [facility] ENDOCRINOLOGY at [facility]. Please see a copy of my visit note below.       HPI:   Ashley is a 21 yo woman here with her mom for follow up of type 1 diabetes, diagnosed at age 2.  This has been poorly controlled for many years, however she has been working closely with our clinic to tighten her control over the past 1.5 years.  At her last visit, her a1c was down to 8.8%, the lowest it had been in years.  Today she reports that her blood sugars are fluctuating quite a lot.  She feels like she is constantly eating to prevent low sugars from occurring.  She does not enter her carbs because she feels her sugars will go to low if she does.  She thinks she is taking her boluses about 75% of the time now.   She has been more physically active this summer with her job on the [employer].  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  She often goes low (then goes very high) while at work.     Ashley has had some discomfort with her Dexcom sensor so she has not been wearing it.  She has been testing her blood sugar 1-2 times a day with an overall average of 229 mg/dL this month.   She boluses about 2-3 times a day.       Ashley wears a Medtronic paradigm revel pump with basal rates as follows:     Mid- 0.6  9am- 0.75  Noon- 0.8  8pm-  0.75    IC ratio- 1:15g (although she does not usually count carbs)  Sensitivity- 50   Target glucose-   Active insulin time- 4 hours    Average total daily insulin dose- 35 Units (49%basal, 51%bolus)    Ashley also started wearing her infusion set on her thighs, rather than her gluteal region.        ROS  GENERAL: weight stable.  No fevers, chills, malaise, night sweats.   HEENT: no dysphagia, diplopia, neck pain or tenderness, dry/scratchy eyes, URI, cough, sinus drainage,  tinnitus, sinus pressure  CV: no chest pain, pressure.  +palpitations occasionally.  Feels anxious.   LUNGS: no SOB, CARRASCO, cough, sputum production, wheezing   GI: no diarrhea, constipation, abdominal pain  EXTREMITIES: no rashes, ulcers, edema  NEUROLOGY: no changes in vision, tingling or numbness in hands or feet.   MSK: no muscle aches or pains, weakness  PSYCH: anxiety a bit better    Past Medical History:   Diagnosis Date     Diabetes (H)     Diagnosed at 18 months old, currently with insulin pump       Past Surgical History:   Procedure Laterality Date     APPENDECTOMY  04/2018       Family History   Problem Relation Age of Onset     Diabetes Mother      Hyperlipidemia Mother      Mental Illness Mother      Diabetes Father      Mental Illness Father      Diabetes Maternal Grandfather      Hypertension Maternal Grandfather      Hyperlipidemia Maternal Grandfather      Breast Cancer Paternal Grandmother      Cerebrovascular Disease Paternal Grandfather      Family History:  Mother-Type 2 DM  Father-Type 2 DM  Great grand aunt-DM type 2  Uncles with Type 2 DM   No thyroid disease or cancers.     Social History     Social History     Marital Status: Single     Spouse Name: N/A     Number of Children: N/A     Years of Education: N/A     Social History Main Topics     Smoking status: Never Smoker      Smokeless tobacco: None     Alcohol Use: No     Drug Use: No     Sexual Activity: Not Asked     Other Topics Concern     None     Social History Narrative   Social Hx: Ashley is single, lives at home with her parents and older brother.  She is very interested in photography and was enrolled at the [employer], but stopped going to classes due to her health issues.   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    Current Outpatient Prescriptions   Medication  "    blood glucose monitoring (MILA CONTOUR NEXT) test strip     blood glucose monitoring (NO BRAND SPECIFIED) test strip     escitalopram (LEXAPRO) 20 MG tablet     insulin lispro (ADMELOG) 100 UNIT/ML injection     Insulin Pump Accessories (SNAP INSULIN PUMP CONTROLLER) MISC     ketone blood test STRP     Multiple vitamin  s TABS     OMEPRAZOLE PO     glucagon 1 MG kit     insulin lispro (HUMALOG) 100 UNIT/ML injection     MELATONIN PO     ondansetron (ZOFRAN-ODT) 4 MG ODT tab     No current facility-administered medications for this visit.           Allergies   Allergen Reactions     No Clinical Screening - See Comments Hives and Rash     straw       Physical Exam  /71  Pulse 74  Ht 1.575 m (5' 2\")  Wt 53.6 kg (118 lb 3.2 oz)  BMI 21.62 kg/m2  GENERAL:  Alert and oriented X3, NAD, well dressed, answering questions appropriately, appears stated age.  EXTREMITIES: no edema, +pulses, no rashes, no lesions    RESULTS  Lab Results   Component Value Date    A1C 12.2 01/11/2016       TSH   Date Value Ref Range Status   10/31/2016 1.63 0.40 - 4.00 mU/L Final       Creatinine   Date Value Ref Range Status   10/31/2016 0.63 0.50 - 1.00 mg/dL Final   ]    No results found for: ALBUMIN]    No results found for: ALT]    No results for input(s): CHOL, HDL, LDL, TRIG, CHOLHDLRATIO in the last 81508 hours.    No results found for: B12]    Assessment/Plan:     1.  Type 1 diabetes-  Ashley is struggling with great fluctuations in her blood sugars.  A1c is up to 10.2%.  She has been dealing with a lot of hypoglycemia since switching to Admelog from Humalog.  She is taking almost half of the recommended mealtime boluses and is still going low.   A combination of things may be causing the increase in insulin sensitivity.  She has been more active this summer, switched insulin and has also been placing her infusion site in her thighs, which may be resulting in better absorption.  Clearly, she needs less insulin.      We " made the following changes (instructions given to patient):     Reduce basal rate to 0.5 units x 24 hours.  This is a big change.  Please let me know if you continue going low of if you are continuously high.  Please check in with me again by Thursday.     Change your insulin:carb ratio to 1:20g (was 1/15g)    Enter all carbs.     Focus on bedtime corrections.      Get on carelink and share your info.      Send me a [employer] message and give me your username and password    Once you are on the Dexcom G6, please share your info.      Much encouragement given for taking responsibility for her diabetes and the great progress she has made, despite the recent slip in a1c.  I reminded her that improving her control is very difficult when her sugars are going low so often.  We did a review of her carbs and calculated she ate 220g, which would have given her 14 units.  She in fact gave only 8 units and she still dropped to 53 mg/dL shortly after.  She is meeting DM educator today.  She is interested in the G6 Dexcom and might be interested in a different insulin pump.  We discussed both the 670g and the Tandem with basal IQ.  She is interested in the Tandem.  Sensor and pump are imperative for improving control.     2.  Risk factors-     Retinopathy:  No.  Had eye exam within last year.   Nephropathy:  BP well controlled. TSH ok.  No microalbuminuria.  Creatinine is stable.   Neuropathy: No.    Feet: OK, no ulcers.   Lipids: Need labs    3.  F/U in 1 month with me, in 6 months with Dr. Stern, sooner with concerns or hypoglycemia.     I spent 30 minutes with this patient face to face and explained the conditions and plans (more than 50% of time was counseling/coordination of care, diabetes management, follow up plan for worsening hyper and hypoglycemia) . The patient understood and is satisfied with today's visit.       Aiyana Melendez PA-C, MPAS   [facility]  Department of Medicine  Division of Endocrinology and  Diabetes

## 2018-09-10 NOTE — PATIENT INSTRUCTIONS
Reduce basal rate to 0.5 units x 24 hours.  This is a big change.  Please let me know if you continue going low of if you are continuously high.  Please check in with me again by Thursday.     Change your insulin:carb ratio to 1:20g (was 1:15g)    Enter all carbs.  Use calorie caridad barbara on your phone to calculate carbs.     Focus on bedtime corrections.      Get on carelink and share your info.      Send me a [employer] message and give me your username and password    Once you are on the Dexcom G6, please share your info.      [email or web address]    Keep up the great work and stay in touch!    Aiyana

## 2018-09-10 NOTE — MR AVS SNAPSHOT
After Visit Summary   9/10/2018    Ashley Gonzales    MRN: [id number]           Patient Information     Date Of Birth          1995        Visit Information        Provider Department      9/10/2018 4:30 PM Sadia Willingham RN [facility] Diabetes        Care Instructions    1.  Expect a call from [employer] about the T:Slim X2 insulin pump.  Be sure to answer your phone or e-mail.    2.  If you decide to go ahead with it please let us know.  There is paperwork for us to do on our end.  We will schedule your pump start at that time.    3.  Use the Ryanne sensor while you are waiting for the DexCom.    Sadia Willingham RN,CDE  [address] [address]  Phone: [phone]  [email or web address]                Follow-ups after your visit        Your next 10 appointments already scheduled     Oct 15, 2018  1:00 PM CDT   (Arrive by 12:45 PM)   RETURN DIABETES with JOSE ANTONIO Seaman [facility] Endocrinology ([facility])    [address] [place] [id number] [phone]              Who to contact     Please call your clinic at [phone] to:    Ask questions about your health    Make or cancel appointments    Discuss your medicines    Learn about your test results    Speak to your doctor            Additional Information About Your Visit        MyChart Information     [employer] gives you secure access to your electronic health record. If you see a primary care provider, you can also send messages to your care team and make appointments. If you have questions, please call your primary care clinic.  If you do not have a primary care provider, please call [phone] and they will assist you.      News360 is an electronic gateway that provides easy, online access to your medical records. With News360, you can request a clinic appointment, read your test results, renew a prescription or communicate with your care team.     To  access your existing account, please contact your [facility] or call [phone] for assistance.        Care EveryWhere ID     This is your Care EveryWhere ID. This could be used by other organizations to access your [facility] medical records  [id number]         Blood Pressure from Last 3 Encounters:   09/10/18 116/71   04/16/18 107/73   11/13/17 113/74    Weight from Last 3 Encounters:   09/10/18 53.6 kg (118 lb 3.2 oz)   04/16/18 51.3 kg (113 lb 3.2 oz)   11/13/17 51.9 kg (114 lb 6.4 oz)              Today, you had the following     No orders found for display       Primary Care Provider Office Phone # Fax #    Mamadou Martinez [phone]       [facility] [address]  [facility] [address]        Equal Access to Services     [facility] Center[facility]NEY : Hadii aad ku hadasho Soomaali, waaxda luqadaha, qaybta kaalmada adeegyada, waxay idiin hayaan bill saini . So [facility] [phone].    ATENCIÓN: Si habla español, tiene a mar disposición servicios gratuitos de asistencia lingüística. Llame al [phone].    We comply with applicable federal civil rights laws and [place] laws. We do not discriminate on the basis of race, color, national origin, age, disability, sex, sexual orientation, or gender identity.            Thank you!     Thank you for choosing [facility] DIABETES  for your care. Our goal is always to provide you with excellent care. Hearing back from our patients is one way we can continue to improve our services. Please take a few minutes to complete the written survey that you may receive in the mail after your visit with us. Thank you!             Your Updated Medication List - Protect others around you: Learn how to safely use, store and throw away your medicines at www.disposemymeds.org.          This list is accurate as of 9/10/18  4:59 PM.  Always use your most recent med list.                   Brand Name Dispense Instructions for  use Diagnosis    * blood glucose monitoring test strip    no brand specified     TEST 6-8 TIMES DAILY        * blood glucose monitoring test strip    MILA CONTOUR NEXT    200 each    1 strip by In Vitro route 4 times daily Use to test blood sugar 4 times daily or as directed.        escitalopram 20 MG tablet    LEXAPRO     Take 20 mg by mouth        glucagon 1 MG kit     1 mg    Inject 1 mg into the muscle once for 1 dose INJECT SUBCUTANEOUSLY AS DIRECTED AS NEEDED        * insulin lispro 100 UNIT/ML injection    humaLOG    40 mL    Use as directed in insulin pump up to 45 units daily    Type 1 diabetes mellitus (H)       * insulin lispro 100 UNIT/ML injection    ADMELOG    45 mL    Inject subcu via pump approx 45units daily    Diabetes mellitus type 1 (H)       ketone blood test Strp     10 each    As directed.    Poorly controlled type 1 diabetes mellitus (H)       MELATONIN PO      Take 5 mg by mouth nightly as needed        Multiple vitamin  s Tabs      Take 1 tablet by mouth        OMEPRAZOLE PO      Take 20 mg by mouth        ondansetron 4 MG ODT tab    ZOFRAN-ODT     Place 4 mg under the tongue as needed        SNAP INSULIN PUMP CONTROLLER Misc      As directed. Novalog:  Gives a base rate, boluses according to 1-3 times/day        * Notice:  This list has 4 medication(s) that are the same as other medications prescribed for you. Read the directions carefully, and ask your doctor or other care provider to review them with you.

## 2018-09-10 NOTE — PROGRESS NOTES
HPI:   Ashley is a 23 yo woman here with her mom for follow up of type 1 diabetes, diagnosed at age 2.  This has been poorly controlled for many years, however she has been working closely with our clinic to tighten her control over the past 1.5 years.  At her last visit, her a1c was down to 8.8%, the lowest it had been in years.  Today she reports that her blood sugars are fluctuating quite a lot.  She feels like she is constantly eating to prevent low sugars from occurring.  She does not enter her carbs because she feels her sugars will go to low if she does.  She thinks she is taking her boluses about 75% of the time now.   She has been more physically active this summer with her job on the [employer].  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  She often goes low (then goes very high) while at work.     Ashley has had some discomfort with her Dexcom sensor so she has not been wearing it.  She has been testing her blood sugar 1-2 times a day with an overall average of 229 mg/dL this month.   She boluses about 2-3 times a day.       Ashley wears a Medtronic paradigm revel pump with basal rates as follows:     Mid- 0.6  9am- 0.75  Noon- 0.8  8pm-  0.75    IC ratio- 1:15g (although she does not usually count carbs)  Sensitivity- 50   Target glucose-   Active insulin time- 4 hours    Average total daily insulin dose- 35 Units (49%basal, 51%bolus)    Ashley also started wearing her infusion set on her thighs, rather than her gluteal region.        ROS  GENERAL: weight stable.  No fevers, chills, malaise, night sweats.   HEENT: no dysphagia, diplopia, neck pain or tenderness, dry/scratchy eyes, URI, cough, sinus drainage, tinnitus, sinus pressure  CV: no chest pain, pressure.  +palpitations occasionally.  Feels anxious.   LUNGS: no SOB, CARRASCO, cough, sputum production, wheezing   GI: no diarrhea, constipation, abdominal pain  EXTREMITIES: no rashes, ulcers, edema  NEUROLOGY: no changes in vision,  tingling or numbness in hands or feet.   MSK: no muscle aches or pains, weakness  PSYCH: anxiety a bit better    Past Medical History:   Diagnosis Date     Diabetes (H)     Diagnosed at 18 months old, currently with insulin pump       Past Surgical History:   Procedure Laterality Date     APPENDECTOMY  04/2018       Family History   Problem Relation Age of Onset     Diabetes Mother      Hyperlipidemia Mother      Mental Illness Mother      Diabetes Father      Mental Illness Father      Diabetes Maternal Grandfather      Hypertension Maternal Grandfather      Hyperlipidemia Maternal Grandfather      Breast Cancer Paternal Grandmother      Cerebrovascular Disease Paternal Grandfather      Family History:  Mother-Type 2 DM  Father-Type 2 DM  Great grand aunt-DM type 2  Uncles with Type 2 DM   No thyroid disease or cancers.     Social History     Social History     Marital Status: Single     Spouse Name: N/A     Number of Children: N/A     Years of Education: N/A     Social History Main Topics     Smoking status: Never Smoker      Smokeless tobacco: None     Alcohol Use: No     Drug Use: No     Sexual Activity: Not Asked     Other Topics Concern     None     Social History Narrative   Social Hx: Ashley is single, lives at home with her parents and older brother.  She is very interested in photography and was enrolled at the [employer], but stopped going to classes due to her health issues.   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    Current Outpatient Prescriptions   Medication     blood glucose monitoring (MILA CONTOUR NEXT) test strip     blood glucose monitoring (NO BRAND SPECIFIED) test strip     escitalopram (LEXAPRO) 20 MG tablet     insulin lispro (ADMELOG) 100 UNIT/ML injection     Insulin Pump Accessories (SNAP INSULIN PUMP CONTROLLER) MISC  "    ketone blood test STRP     Multiple vitamin  s TABS     OMEPRAZOLE PO     glucagon 1 MG kit     insulin lispro (HUMALOG) 100 UNIT/ML injection     MELATONIN PO     ondansetron (ZOFRAN-ODT) 4 MG ODT tab     No current facility-administered medications for this visit.           Allergies   Allergen Reactions     No Clinical Screening - See Comments Hives and Rash     straw       Physical Exam  /71  Pulse 74  Ht 1.575 m (5' 2\")  Wt 53.6 kg (118 lb 3.2 oz)  BMI 21.62 kg/m2  GENERAL:  Alert and oriented X3, NAD, well dressed, answering questions appropriately, appears stated age.  EXTREMITIES: no edema, +pulses, no rashes, no lesions    RESULTS  Lab Results   Component Value Date    A1C 12.2 01/11/2016       TSH   Date Value Ref Range Status   10/31/2016 1.63 0.40 - 4.00 mU/L Final       Creatinine   Date Value Ref Range Status   10/31/2016 0.63 0.50 - 1.00 mg/dL Final   ]    No results found for: ALBUMIN]    No results found for: ALT]    No results for input(s): CHOL, HDL, LDL, TRIG, CHOLHDLRATIO in the last 02358 hours.    No results found for: B12]    Assessment/Plan:     1.  Type 1 diabetes-  Ashley is struggling with great fluctuations in her blood sugars.  A1c is up to 10.2%.  She has been dealing with a lot of hypoglycemia since switching to Admelog from Humalog.  She is taking almost half of the recommended mealtime boluses and is still going low.   A combination of things may be causing the increase in insulin sensitivity.  She has been more active this summer, switched insulin and has also been placing her infusion site in her thighs, which may be resulting in better absorption.  Clearly, she needs less insulin.      We made the following changes (instructions given to patient):     Reduce basal rate to 0.5 units x 24 hours.  This is a big change.  Please let me know if you continue going low of if you are continuously high.  Please check in with me again by Thursday.     Change your " insulin:carb ratio to 1:20g (was 1/15g)    Enter all carbs.     Focus on bedtime corrections.      Get on carelink and share your info.      Send me a [employer] message and give me your username and password    Once you are on the Dexcom G6, please share your info.      Much encouragement given for taking responsibility for her diabetes and the great progress she has made, despite the recent slip in a1c.  I reminded her that improving her control is very difficult when her sugars are going low so often.  We did a review of her carbs and calculated she ate 220g, which would have given her 14 units.  She in fact gave only 8 units and she still dropped to 53 mg/dL shortly after.  She is meeting DM educator today.  She is interested in the G6 Dexcom and might be interested in a different insulin pump.  We discussed both the 670g and the Tandem with basal IQ.  She is interested in the Tandem.  Sensor and pump are imperative for improving control.     2.  Risk factors-     Retinopathy:  No.  Had eye exam within last year.   Nephropathy:  BP well controlled. TSH ok.  No microalbuminuria.  Creatinine is stable.   Neuropathy: No.    Feet: OK, no ulcers.   Lipids: Need labs    3.  F/U in 1 month with me, in 6 months with Dr. Stern, sooner with concerns or hypoglycemia.     I spent 30 minutes with this patient face to face and explained the conditions and plans (more than 50% of time was counseling/coordination of care, diabetes management, follow up plan for worsening hyper and hypoglycemia) . The patient understood and is satisfied with today's visit.       Aiyana Melendez PA-C, MPAS   [facility]  Department of Medicine  Division of Endocrinology and Diabetes

## 2018-09-10 NOTE — PROGRESS NOTES
"Diabetes Self-Management Education & Support    Diabetes Education Self Management & Training    SUBJECTIVE/OBJECTIVE:     Cultural Influences/Ethnic Background:  Not  or      Concerns:  Getting a new pump and sensor      Patient Problem List and Family Medical History reviewed for relevant medical history, current medical status, and diabetes risk factors.    Vitals:    Estimated body mass index is 21.62 kg/(m^2) as calculated from the following:    Height as of an earlier encounter on 9/10/18: 1.575 m (5' 2\").    Weight as of an earlier encounter on 9/10/18: 53.6 kg (118 lb 3.2 oz).   Last 3 BP:   BP Readings from Last 3 Encounters:   09/10/18 116/71   04/16/18 107/73   11/13/17 113/74       History   Smoking Status     Never Smoker   Smokeless Tobacco     Never Used       Labs:  Lab Results   Component Value Date    A1C 12.2 01/11/2016     No results found for: GLC  No results found for: LDL  No results found for: HDL]  GFR Estimate   Date Value Ref Range Status   10/31/2016 >90  Non  GFR Calc   >60 mL/min/1.7m2 Final     GFR Estimate If Black   Date Value Ref Range Status   10/31/2016 >90   GFR Calc   >60 mL/min/1.7m2 Final     Lab Results   Component Value Date    CR 0.63 10/31/2016          ASSESSMENT:  Ashley is having trouble working and taking care of her diabetes.  She wears a Paradigm pump and has been on her pump for many years.  She has trouble finding the time to check her blood sugar as frequently as needed.  She is interested in a T:Slim X2.       Patient's most recent   Lab Results   Component Value Date    A1C 12.2 01/11/2016    is not meeting goal of <7.0      Education provided today on:   Reviewed and demonstrated operation of the following pumps:  The Omnipod 400, Medtronic 670G with Guardian 3 sensor, and Tandem X2 with Dexcom G5 sensor.     Discussed unique features of each pump and what qualities are most important to patient.  Discussion " "included the operation of the 670G Hybrid Closed Loop system and the particular behaviors around that pump that need to be adhered to, including using the bolus calculator, counting carbohydrates accurately, calibrating the sensor appropriately.  Discussed that the results seen in the studies of the system that were done were a result of staying in \"auto\" mode > 85% of the time.      Explained the upgrade process, which includes making sure that warranty has  on the current pump, completing the CMN, processing through the pump company and approval by insurance company.  Also reviewed training that would be necessary to ensure safe operation of the pump.      Ashley would like to try a Ryanne while she is waiting for her DexCom.  We put one on her in the office tonight.  SN [id number].  She was given a reader as well.  Instruction was provided.  She seemed to understand that she needs to double check the results.    Ashley's Medtronic Paradigm pump is cracking around the reservoir.  Pt verbalized understanding of concepts discussed and recommended    PLAN:  Submitted AOB, letter of medical necessity, and last 2 chart notes to [facility]  See Patient Instructions for co-developed, patient-stated behavior change goals.  AVS printed and provided to patient today. See Follow-Up section for recommended follow-up.      Time Spent: 60 minutes  Encounter Type: Individual    Any diabetes medication dose changes were made via the CDE Protocol and Collaborative Practice Agreement with the patient's referring provider. A copy of this encounter was shared with the provider.    "

## 2018-09-11 ENCOUNTER — DOCUMENTATION ONLY (OUTPATIENT)
Dept: ENDOCRINOLOGY | Facility: CLINIC | Age: 23
End: 2018-09-11

## 2018-09-11 NOTE — PROGRESS NOTES
[facility] CLINICAL DOCUMENTATION    Form Documentation Form or Letter Request    Type or form/letter needing completion: Tslim x2  Provider: Maulik/Al  Has provider seen patient for office visit related to reason for form request? Yes  Date form needed: ASAP  Once completed: Fax form to: [facility]

## 2018-10-05 ENCOUNTER — DOCUMENTATION ONLY (OUTPATIENT)
Dept: ENDOCRINOLOGY | Facility: CLINIC | Age: 23
End: 2018-10-05

## 2018-10-05 NOTE — PROGRESS NOTES
[facility] CLINICAL DOCUMENTATION    Form Documentation Form or Letter Request    Type or form/letter needing completion: TSLIM  Provider: JESSICA  Has provider seen patient for office visit related to reason for form request? Yes  Date form needed: ASAP  Once completed: Fax form to: TANDEM

## 2018-11-05 ENCOUNTER — OFFICE VISIT (OUTPATIENT)
Dept: ENDOCRINOLOGY | Facility: CLINIC | Age: 23
End: 2018-11-05
Payer: MEDICAID

## 2018-11-05 ENCOUNTER — TELEPHONE (OUTPATIENT)
Dept: EDUCATION SERVICES | Facility: CLINIC | Age: 23
End: 2018-11-05

## 2018-11-05 VITALS
SYSTOLIC BLOOD PRESSURE: 107 MMHG | HEIGHT: 62 IN | WEIGHT: 116.8 LBS | HEART RATE: 92 BPM | BODY MASS INDEX: 21.49 KG/M2 | DIASTOLIC BLOOD PRESSURE: 72 MMHG

## 2018-11-05 DIAGNOSIS — E10.65 POORLY CONTROLLED TYPE 1 DIABETES MELLITUS (H): Primary | ICD-10-CM

## 2018-11-05 ASSESSMENT — PAIN SCALES - GENERAL: PAINLEVEL: NO PAIN (0)

## 2018-11-05 NOTE — PROGRESS NOTES
HPI:   Ashley is a 22 yo woman here with her mom for follow up of type 1 diabetes, diagnosed at age 2.  Today [birth year] her birthday.  She reports that her sugars have been much harder to control since switching from Humalog to Admelog and she eventually became so frustrated that she stopped testing her blood sugars much.  She has tested a few times in the 90s in the AM, but she has climbed a lot in the day.  She is taking boluses about 75-80% of the time, although she does not enter carbs.  At her last visit, she was having a lot of hypoglycemia, so we cut back her basal rates and carb ratios.  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      Ashley has had some discomfort with her Dexcom sensor so she has not been wearing it.  She has been testing her blood less than once a day with an overall average of 261 mg/dL this month.   She boluses about 2-3 times a day.       Ashley wears a Medtronic paradigm revel pump with basal rates as follows:     Mid- 0.5  9am- 0.5  Noon- 0.5  8pm-  0.5    IC ratio- 1:20g (although she does not usually count carbs)  Sensitivity- 50   Target glucose-   Active insulin time- 4 hours    Average total daily insulin dose- 35 Units (49%basal, 51%bolus)    Ashley also started wearing her infusion set on her thighs, rather than her gluteal region.        ROS  GENERAL: weight stable.  No fevers, chills, malaise, night sweats.   HEENT: no dysphagia, diplopia, neck pain or tenderness, dry/scratchy eyes, URI, cough, sinus drainage, tinnitus, sinus pressure  CV: no chest pain, pressure.  +palpitations occasionally.  Feels anxious.   LUNGS: no SOB, CARRASCO, cough, sputum production, wheezing   GI: no diarrhea, constipation, abdominal pain  EXTREMITIES: no rashes, ulcers, edema  NEUROLOGY: no changes in vision, tingling or numbness in hands or feet.   MSK: no muscle aches or pains, weakness  PSYCH: anxiety a bit  better    Past Medical History:   Diagnosis Date     Diabetes (H)     Diagnosed at 18 months old, currently with insulin pump       Past Surgical History:   Procedure Laterality Date     APPENDECTOMY  04/2018       Family History   Problem Relation Age of Onset     Diabetes Mother      Hyperlipidemia Mother      Mental Illness Mother      Diabetes Father      Mental Illness Father      Diabetes Maternal Grandfather      Hypertension Maternal Grandfather      Hyperlipidemia Maternal Grandfather      Breast Cancer Paternal Grandmother      Cerebrovascular Disease Paternal Grandfather      Family History:  Mother-Type 2 DM  Father-Type 2 DM  Great grand aunt-DM type 2  Uncles with Type 2 DM   No thyroid disease or cancers.     Social History     Social History     Marital Status: Single     Spouse Name: N/A     Number of Children: N/A     Years of Education: N/A     Social History Main Topics     Smoking status: Never Smoker      Smokeless tobacco: None     Alcohol Use: No     Drug Use: No     Sexual Activity: Not Asked     Other Topics Concern     None     Social History Narrative   Social Hx: Ashley is single, lives at home with her parents and older brother.  She is very interested in photography and was enrolled at the [employer], but stopped going to classes due to her health issues.   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    Current Outpatient Prescriptions   Medication     blood glucose monitoring (MILA CONTOUR NEXT) test strip     blood glucose monitoring (NO BRAND SPECIFIED) test strip     escitalopram (LEXAPRO) 20 MG tablet     insulin lispro (ADMELOG) 100 UNIT/ML injection     Insulin Pump Accessories (SNAP INSULIN PUMP CONTROLLER) MISC     ketone blood test STRP     MELATONIN PO     Multiple vitamin  s TABS     OMEPRAZOLE PO      "ondansetron (ZOFRAN-ODT) 4 MG ODT tab     glucagon 1 MG kit     insulin lispro (HUMALOG) 100 UNIT/ML injection     No current facility-administered medications for this visit.           Allergies   Allergen Reactions     No Clinical Screening - See Comments Hives and Rash     straw       Physical Exam  /72  Pulse 92  Ht 1.575 m (5' 2.01\")  Wt 53 kg (116 lb 12.8 oz)  BMI 21.36 kg/m2  GENERAL:  Alert and oriented X3, NAD, well dressed, answering questions appropriately, appears stated age.  EXTREMITIES: no edema, +pulses, no rashes, no lesions    RESULTS  Lab Results   Component Value Date    A1C 12.2 01/11/2016       TSH   Date Value Ref Range Status   10/31/2016 1.63 0.40 - 4.00 mU/L Final       Creatinine   Date Value Ref Range Status   10/31/2016 0.63 0.50 - 1.00 mg/dL Final   ]    No results found for: ALBUMIN]    No results found for: ALT]    No results for input(s): CHOL, HDL, LDL, TRIG, CHOLHDLRATIO in the last 96954 hours.    No results found for: B12]    Assessment/Plan:     1.  Type 1 diabetes-  Ashley is struggling with great fluctuations in her blood sugars. She will be starting the Dexcom G6 and the Tandem X2 pump soon.  She is not testing her sugars much today, but she seems high most of the day.  We made the following plan (instructions given to patient):     Test your sugars twice a day    Correct at those two time points.     Change your basal rates as follows:   Mid- 0.5 (no change)  9am- 0.6 (up from 0.5)  8pm- 0.5 (no change)    Focus on covering all of your carbs.     She also has not tolerated the Admelog well, so we will switch back to Humalog.  Sugars are much more variable since switching.  She has been very frustrated.  Anticipate much better control once she starts using the Tandem pump and sensor.  Will schedule with Sadia to start this up.     2.  Risk factors-     Retinopathy:  No.  Had eye exam within last year.   Nephropathy:  BP well controlled. TSH ok.  No " microalbuminuria.  Creatinine is stable.   Neuropathy: No.    Feet: OK, no ulcers.   Lipids: Need labs    3.  F/U in 1 month with me, in 6 months with Dr. Stern, sooner with concerns or hypoglycemia.     I spent 30 minutes with this patient face to face and explained the conditions and plans (more than 50% of time was counseling/coordination of care, diabetes management, follow up plan for worsening hyper and hypoglycemia) . The patient understood and is satisfied with today's visit.       Aiyana Melendez PA-C, MPAS   [facility]  Department of Medicine  Division of Endocrinology and Diabetes

## 2018-11-05 NOTE — LETTER
11/5/2018     RE: Ashley Gonzales  [address] [facility] [address]     Dear Colleague,    Thank you for referring your patient, Ashley Gonzales, to the [facility] ENDOCRINOLOGY at [facility]. Please see a copy of my visit note below.       HPI:   Ashley is a 24 yo woman here with her mom for follow up of type 1 diabetes, diagnosed at age 2.  Today is her birthday.  She reports that her sugars have been much harder to control since switching from Humalog to Admelog and she eventually became so frustrated that she stopped testing her blood sugars much.  She has tested a few times in the 90s in the AM, but she has climbed a lot in the day.  She is taking boluses about 75-80% of the time, although she does not enter carbs.  At her last visit, she was having a lot of hypoglycemia, so we cut back her basal rates and carb ratios.  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      Ashley has had some discomfort with her Dexcom sensor so she has not been wearing it.  She has been testing her blood less than once a day with an overall average of 261 mg/dL this month.   She boluses about 2-3 times a day.       Ashley wears a Medtronic paradigm revel pump with basal rates as follows:     Mid- 0.5  9am- 0.5  Noon- 0.5  8pm-  0.5    IC ratio- 1:20g (although she does not usually count carbs)  Sensitivity- 50   Target glucose-   Active insulin time- 4 hours    Average total daily insulin dose- 35 Units (49%basal, 51%bolus)    Ashley also started wearing her infusion set on her thighs, rather than her gluteal region.        ROS  GENERAL: weight stable.  No fevers, chills, malaise, night sweats.   HEENT: no dysphagia, diplopia, neck pain or tenderness, dry/scratchy eyes, URI, cough, sinus drainage, tinnitus, sinus pressure  CV: no chest pain, pressure.  +palpitations occasionally.  Feels anxious.   LUNGS: no  SOB, CARRASCO, cough, sputum production, wheezing   GI: no diarrhea, constipation, abdominal pain  EXTREMITIES: no rashes, ulcers, edema  NEUROLOGY: no changes in vision, tingling or numbness in hands or feet.   MSK: no muscle aches or pains, weakness  PSYCH: anxiety a bit better    Past Medical History:   Diagnosis Date     Diabetes (H)     Diagnosed at 18 months old, currently with insulin pump       Past Surgical History:   Procedure Laterality Date     APPENDECTOMY  04/2018       Family History   Problem Relation Age of Onset     Diabetes Mother      Hyperlipidemia Mother      Mental Illness Mother      Diabetes Father      Mental Illness Father      Diabetes Maternal Grandfather      Hypertension Maternal Grandfather      Hyperlipidemia Maternal Grandfather      Breast Cancer Paternal Grandmother      Cerebrovascular Disease Paternal Grandfather      Family History:  Mother-Type 2 DM  Father-Type 2 DM  Great grand aunt-DM type 2  Uncles with Type 2 DM   No thyroid disease or cancers.     Social History     Social History     Marital Status: Single     Spouse Name: N/A     Number of Children: N/A     Years of Education: N/A     Social History Main Topics     Smoking status: Never Smoker      Smokeless tobacco: None     Alcohol Use: No     Drug Use: No     Sexual Activity: Not Asked     Other Topics Concern     None     Social History Narrative   Social Hx: Ashley is single, lives at home with her parents and older brother.  She is very interested in photography and was enrolled at the [employer], but stopped going to classes due to her health issues.   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    Current Outpatient Prescriptions   Medication     blood glucose monitoring (MILA CONTOUR NEXT) test strip     blood glucose monitoring (NO BRAND SPECIFIED)  "test strip     escitalopram (LEXAPRO) 20 MG tablet     insulin lispro (ADMELOG) 100 UNIT/ML injection     Insulin Pump Accessories (SNAP INSULIN PUMP CONTROLLER) MISC     ketone blood test STRP     MELATONIN PO     Multiple vitamin  s TABS     OMEPRAZOLE PO     ondansetron (ZOFRAN-ODT) 4 MG ODT tab     glucagon 1 MG kit     insulin lispro (HUMALOG) 100 UNIT/ML injection     No current facility-administered medications for this visit.           Allergies   Allergen Reactions     No Clinical Screening - See Comments Hives and Rash     straw       Physical Exam  /72  Pulse 92  Ht 1.575 m (5' 2.01\")  Wt 53 kg (116 lb 12.8 oz)  BMI 21.36 kg/m2  GENERAL:  Alert and oriented X3, NAD, well dressed, answering questions appropriately, appears stated age.  EXTREMITIES: no edema, +pulses, no rashes, no lesions    RESULTS  Lab Results   Component Value Date    A1C 12.2 01/11/2016       TSH   Date Value Ref Range Status   10/31/2016 1.63 0.40 - 4.00 mU/L Final       Creatinine   Date Value Ref Range Status   10/31/2016 0.63 0.50 - 1.00 mg/dL Final   ]    No results found for: ALBUMIN]    No results found for: ALT]    No results for input(s): CHOL, HDL, LDL, TRIG, CHOLHDLRATIO in the last 68571 hours.    No results found for: B12]    Assessment/Plan:     1.  Type 1 diabetes-  Ashley is struggling with great fluctuations in her blood sugars. She will be starting the Dexcom G6 and the Tandem X2 pump soon.  She is not testing her sugars much today, but she seems high most of the day.  We made the following plan (instructions given to patient):     Test your sugars twice a day    Correct at those two time points.     Change your basal rates as follows:   Mid- 0.5 (no change)  9am- 0.6 (up from 0.5)  8pm- 0.5 (no change)    Focus on covering all of your carbs.     She also has not tolerated the Admelog well, so we will switch back to Humalog.  Sugars are much more variable since switching.  She has been very frustrated.  " Anticipate much better control once she starts using the Tandem pump and sensor.  Will schedule with Sadia to start this up.     2.  Risk factors-     Retinopathy:  No.  Had eye exam within last year.   Nephropathy:  BP well controlled. TSH ok.  No microalbuminuria.  Creatinine is stable.   Neuropathy: No.    Feet: OK, no ulcers.   Lipids: Need labs    3.  F/U in 1 month with me, in 6 months with Dr. Stern, sooner with concerns or hypoglycemia.     I spent 30 minutes with this patient face to face and explained the conditions and plans (more than 50% of time was counseling/coordination of care, diabetes management, follow up plan for worsening hyper and hypoglycemia) . The patient understood and is satisfied with today's visit.       Aiyana Melendez PA-C, MPAS   [facility]  Department of Medicine  Division of Endocrinology and Diabetes

## 2018-11-05 NOTE — PATIENT INSTRUCTIONS
Test your sugars twice a day    Correct at those two time points.     Change your basal rates as follows:   Mid- 0.5 (no change)  9am- 0.6 (up from 0.5)  8pm- 0.5 (no change)    Focus on covering all of your carbs.

## 2018-11-05 NOTE — TELEPHONE ENCOUNTER
Phone call to Sadia Gonzales Ashley's mom.  She has called her insurance company and the DexCom was denied by insurance.  They will not cover the g6.  She is speaking with her insurance company tomorrow to find out what is needed to appeal the decision.  She will let me know if she needs anything. Sadia Willingham RN,CDE

## 2018-11-05 NOTE — MR AVS SNAPSHOT
After Visit Summary   11/5/2018    Ashley Gonzales    MRN: [id number]           Patient Information     Date Of Birth          1995        Visit Information        Provider Department      11/5/2018 9:30 AM Aiyana Melendez PA-C [facility] Endocrinology        Today's Diagnoses     Poorly controlled type 1 diabetes mellitus (H)    -  1      Care Instructions    Test your sugars twice a day    Correct at those two time points.     Change your basal rates as follows:   Mid- 0.5 (no change)  9am- 0.6 (up from 0.5)  8pm- 0.5 (no change)    Focus on covering all of your carbs.               Follow-ups after your visit        Your next 10 appointments already scheduled     Nov 19, 2018  3:30 PM CST   (Arrive by 3:15 PM)   Diabetes Education with Sadia Willingham RN   [facility] ([facility])    [address] [place] [id number] [phone]            Jan 14, 2019  1:30 PM CST   (Arrive by 1:15 PM)   RETURN DIABETES with JOSE ANTONIO Seaman [facility] Endocrinology ([facility])    [address] [place] [id number] [phone]              Who to contact     Please call your clinic at [phone] to:    Ask questions about your health    Make or cancel appointments    Discuss your medicines    Learn about your test results    Speak to your doctor            Additional Information About Your Visit        [employer] Information     [employer] gives you secure access to your electronic health record. If you see a primary care provider, you can also send messages to your care team and make appointments. If you have questions, please call your primary care clinic.  If you do not have a primary care provider, please call [phone] and they will assist you.      [employer] is an electronic gateway that provides easy, online access to your medical records. With [employer], you can request a clinic appointment,  "read your test results, renew a prescription or communicate with your care team.     To access your existing account, please contact your [facility] or call [phone] for assistance.        Care EveryWhere ID     This is your Care EveryWhere ID. This could be used by other organizations to access your [facility] medical records  [id number]        Your Vitals Were     Pulse Height BMI (Body Mass Index)             92 1.575 m (5' 2.01\") 21.36 kg/m2          Blood Pressure from Last 3 Encounters:   11/05/18 107/72   09/10/18 116/71   04/16/18 107/73    Weight from Last 3 Encounters:   11/05/18 53 kg (116 lb 12.8 oz)   09/10/18 53.6 kg (118 lb 3.2 oz)   04/16/18 51.3 kg (113 lb 3.2 oz)              Today, you had the following     No orders found for display         Today's Medication Changes          These changes are accurate as of 11/5/18 10:35 AM.  If you have any questions, ask your nurse or doctor.               Start taking these medicines.        Dose/Directions    Blood Glucose/Ketone Monitor Aracelis   Used for:  Poorly controlled type 1 diabetes mellitus (H)   Started by:  Aiyana Melendez PA-C        Dose:  1 each   1 each as needed (for high blood sugars or nausea/vomiting)   Quantity:  1 each   Refills:  3         These medicines have changed or have updated prescriptions.        Dose/Directions    insulin lispro 100 UNIT/ML injection   Commonly known as:  humaLOG   This may have changed:  Another medication with the same name was removed. Continue taking this medication, and follow the directions you see here.   Used for:  Poorly controlled type 1 diabetes mellitus (H)   Changed by:  Aiyana Melendez PA-C        Use as directed in insulin pump up to 45 units daily   Quantity:  40 mL   Refills:  3       * ketone blood test Strp   This may have changed:  Another medication with the same name was added. Make sure you understand how and when to take each.   Used for:  Poorly " controlled type 1 diabetes mellitus (H)   Changed by:  Aiyana Melendez PA-C        As directed.   Quantity:  10 each   Refills:  11       * ketone blood test Strp   Commonly known as:  PRECISION XTRA KETONE   This may have changed:  You were already taking a medication with the same name, and this prescription was added. Make sure you understand how and when to take each.   Used for:  Poorly controlled type 1 diabetes mellitus (H)   Changed by:  Aiyana Melendez PA-C        Use as directed for high blood sugar or in case of nausea and vomiting   Quantity:  10 each   Refills:  3       * Notice:  This list has 2 medication(s) that are the same as other medications prescribed for you. Read the directions carefully, and ask your doctor or other care provider to review them with you.         Where to get your medicines      These medications were sent to [id number] - [place], [place] - [address] [facility]  [address] [facility] [address]     Phone:  [phone]     Blood Glucose/Ketone Monitor Aracelis    insulin lispro 100 UNIT/ML injection    ketone blood test Strp                Primary Care Provider Office Phone # Fax #    Mamadou Martinez [phone]       [facility] [address] [place] [facility] [address]        Equal Access to Services     REKHA PIERRE AH: Hadii deny ku hadasho Soomaali, waaxda luqadaha, qaybta kaalmada adeegyada, waxay patrice mosqueda. So [facility] [phone].    ATENCIÓN: Si habla español, tiene a mar disposición servicios gratuitos de asistencia lingüística. Llnarciso al [phone].    We comply with applicable federal civil rights laws and [place] laws. We do not discriminate on the basis of race, color, national origin, age, disability, sex, sexual orientation, or gender identity.            Thank you!     Thank you for choosing [facility] ENDOCRINOLOGY  for your care. Our goal is always to provide you with excellent care. Hearing back from our  patients is one way we can continue to improve our services. Please take a few minutes to complete the written survey that you may receive in the mail after your visit with us. Thank you!             Your Updated Medication List - Protect others around you: Learn how to safely use, store and throw away your medicines at www.disposemymeds.org.          This list is accurate as of 11/5/18 10:35 AM.  Always use your most recent med list.                   Brand Name Dispense Instructions for use Diagnosis    * blood glucose monitoring test strip    no brand specified     TEST 6-8 TIMES DAILY        * blood glucose monitoring test strip    MILA CONTOUR NEXT    200 each    1 strip by In Vitro route 4 times daily Use to test blood sugar 4 times daily or as directed.        Blood Glucose/Ketone Monitor Aracelis     1 each    1 each as needed (for high blood sugars or nausea/vomiting)    Poorly controlled type 1 diabetes mellitus (H)       escitalopram 20 MG tablet    LEXAPRO     Take 20 mg by mouth        glucagon 1 MG kit     1 mg    Inject 1 mg into the muscle once for 1 dose INJECT SUBCUTANEOUSLY AS DIRECTED AS NEEDED        insulin lispro 100 UNIT/ML injection    humaLOG    40 mL    Use as directed in insulin pump up to 45 units daily    Poorly controlled type 1 diabetes mellitus (H)       * ketone blood test Strp     10 each    As directed.    Poorly controlled type 1 diabetes mellitus (H)       * ketone blood test Strp    PRECISION XTRA KETONE    10 each    Use as directed for high blood sugar or in case of nausea and vomiting    Poorly controlled type 1 diabetes mellitus (H)       MELATONIN PO      Take 5 mg by mouth nightly as needed        Multiple vitamin  s Tabs      Take 1 tablet by mouth        OMEPRAZOLE PO      Take 20 mg by mouth        ondansetron 4 MG ODT tab    ZOFRAN-ODT     Place 4 mg under the tongue as needed        SNAP INSULIN PUMP CONTROLLER Misc      As directed. Novalog:  Gives a base rate, boluses  according to 1-3 times/day        * Notice:  This list has 4 medication(s) that are the same as other medications prescribed for you. Read the directions carefully, and ask your doctor or other care provider to review them with you.

## 2018-11-07 ENCOUNTER — TELEPHONE (OUTPATIENT)
Dept: ENDOCRINOLOGY | Facility: CLINIC | Age: 23
End: 2018-11-07

## 2018-11-07 DIAGNOSIS — E10.9 DIABETES MELLITUS TYPE 1 (H): Primary | ICD-10-CM

## 2019-01-03 DIAGNOSIS — E10.65 POORLY CONTROLLED TYPE 1 DIABETES MELLITUS (H): ICD-10-CM

## 2019-01-29 ENCOUNTER — DOCUMENTATION ONLY (OUTPATIENT)
Dept: CARE COORDINATION | Facility: CLINIC | Age: 24
End: 2019-01-29

## 2019-04-01 ENCOUNTER — TELEPHONE (OUTPATIENT)
Dept: ENDOCRINOLOGY | Facility: CLINIC | Age: 24
End: 2019-04-01

## 2019-04-01 NOTE — TELEPHONE ENCOUNTER
I called Mom  and  [facility] to receive pump supplies. [facility] was notified and the  pump supplies are being processed.

## 2019-04-01 NOTE — TELEPHONE ENCOUNTER
Pt got pump supply from [facility] ---Needs to change to local pharmacy     Health Call Center    Phone Message    May a detailed message be left on voicemail: yes    Reason for Call: Other: Pt's mother called in and requested all the pump supply to [facility] due to [facility] no longer being able to prescribe it. Please call back if there is any further concern. Thanks.     Action Taken: Message routed to:  [facility] ([facility]): Ric

## 2019-04-02 ENCOUNTER — TELEPHONE (OUTPATIENT)
Dept: ENDOCRINOLOGY | Facility: CLINIC | Age: 24
End: 2019-04-02

## 2019-04-02 NOTE — TELEPHONE ENCOUNTER
Forms received from: [employer]     Forms were for Supplies for ext insulin infus. Pump     Faxed Date:4/4/19

## 2019-04-04 ENCOUNTER — MEDICAL CORRESPONDENCE (OUTPATIENT)
Dept: HEALTH INFORMATION MANAGEMENT | Facility: CLINIC | Age: 24
End: 2019-04-04

## 2019-05-07 ENCOUNTER — TELEPHONE (OUTPATIENT)
Dept: ENDOCRINOLOGY | Facility: CLINIC | Age: 24
End: 2019-05-07

## 2019-05-07 NOTE — TELEPHONE ENCOUNTER
Forms received from: [facility]     Forms were for Insulin treated diabetes mellitus report     Faxed Date:5/7/19 x 2  Original mailed to patient 5/7/19

## 2019-08-12 ENCOUNTER — TELEPHONE (OUTPATIENT)
Dept: ENDOCRINOLOGY | Facility: CLINIC | Age: 24
End: 2019-08-12

## 2019-08-12 NOTE — TELEPHONE ENCOUNTER
Prior Authorization Retail Medication Request    Medication/Dose: ketone blood test (PRECISION XTRA KETONE) STRP  ICD code (if different than what is on RX):E10.65  Rationale:Poorly controlled type 1 diabetes mellitus     Insurance Name:Medicaid  Insurance ID:[id number]       Pharmacy Information (if different than what is on RX)  Name:[facility]  Phone:[phone]

## 2019-08-19 NOTE — TELEPHONE ENCOUNTER
Central Prior Authorization Team   Phone: [phone]    PA Initiation    Medication: ketone blood test (PRECISION XTRA KETONE) STRP  Insurance Company: [place] Medicaid ([facility]) - Phone [phone] Fax [phone]  Pharmacy Filling the Rx: [id number] - [place] - [address] [place] [address]  Filling Pharmacy Phone: [phone]  Filling Pharmacy Fax:    Start Date: 8/19/2019

## 2019-08-20 NOTE — TELEPHONE ENCOUNTER
Prior Authorization Not Needed per Insurance. Called insurance, verified this is covered and the pharmacy processed rx on 08/12/2019.        Medication: ketone blood test (PRECISION XTRA KETONE) STRP  Insurance Company: [employer] - Phone [phone] Fax [phone]  Expected CoPay:      Pharmacy Filling the Rx: [id number] - [place], [place] - [address] [place] [address]  Pharmacy Notified:    Patient Notified:

## 2019-09-30 ENCOUNTER — OFFICE VISIT (OUTPATIENT)
Dept: ENDOCRINOLOGY | Facility: CLINIC | Age: 24
End: 2019-09-30
Payer: COMMERCIAL

## 2019-09-30 VITALS
DIASTOLIC BLOOD PRESSURE: 80 MMHG | BODY MASS INDEX: 22.6 KG/M2 | SYSTOLIC BLOOD PRESSURE: 120 MMHG | WEIGHT: 119.7 LBS | HEIGHT: 61 IN | HEART RATE: 116 BPM

## 2019-09-30 DIAGNOSIS — E10.65 POORLY CONTROLLED TYPE 1 DIABETES MELLITUS (H): ICD-10-CM

## 2019-09-30 DIAGNOSIS — E10.65 POORLY CONTROLLED TYPE 1 DIABETES MELLITUS (H): Primary | ICD-10-CM

## 2019-09-30 LAB
ANION GAP SERPL CALCULATED.3IONS-SCNC: 6 MMOL/L (ref 3–14)
BUN SERPL-MCNC: 20 MG/DL (ref 7–30)
CALCIUM SERPL-MCNC: 8.5 MG/DL (ref 8.5–10.1)
CHLORIDE SERPL-SCNC: 102 MMOL/L (ref 94–109)
CO2 SERPL-SCNC: 24 MMOL/L (ref 20–32)
CREAT SERPL-MCNC: 0.68 MG/DL (ref 0.52–1.04)
CREAT UR-MCNC: 41 MG/DL
GFR SERPL CREATININE-BSD FRML MDRD: >90 ML/MIN/{1.73_M2}
GLUCOSE SERPL-MCNC: 396 MG/DL (ref 70–99)
HBA1C MFR BLD: 11 % (ref 4.3–6)
HGB BLD-MCNC: 14.2 G/DL (ref 11.7–15.7)
LDLC SERPL DIRECT ASSAY-MCNC: 82 MG/DL
MICROALBUMIN UR-MCNC: <5 MG/L
MICROALBUMIN/CREAT UR: NORMAL MG/G CR (ref 0–25)
POTASSIUM SERPL-SCNC: 4.4 MMOL/L (ref 3.4–5.3)
SODIUM SERPL-SCNC: 132 MMOL/L (ref 133–144)
TSH SERPL DL<=0.005 MIU/L-ACNC: 1.23 MU/L (ref 0.4–4)

## 2019-09-30 PROCEDURE — 83516 IMMUNOASSAY NONANTIBODY: CPT | Performed by: PHYSICIAN ASSISTANT

## 2019-09-30 PROCEDURE — 83721 ASSAY OF BLOOD LIPOPROTEIN: CPT | Performed by: PHYSICIAN ASSISTANT

## 2019-09-30 RX ORDER — PROCHLORPERAZINE 25 MG/1
1 SUPPOSITORY RECTAL
Qty: 1 EACH | Refills: 3 | Status: SHIPPED | OUTPATIENT
Start: 2019-09-30 | End: 2019-10-01

## 2019-09-30 RX ORDER — PROCHLORPERAZINE 25 MG/1
1 SUPPOSITORY RECTAL
Qty: 12 EACH | Refills: 3 | Status: SHIPPED | OUTPATIENT
Start: 2019-09-30 | End: 2019-10-01

## 2019-09-30 ASSESSMENT — PAIN SCALES - GENERAL: PAINLEVEL: NO PAIN (0)

## 2019-09-30 ASSESSMENT — MIFFLIN-ST. JEOR: SCORE: 1245.73

## 2019-09-30 NOTE — PATIENT INSTRUCTIONS
Goals:   1.  Test blood sugar once daily (more if you feel motivated!)  2.  Take insulin before eating 3-4 times per day    Schedule with diabetes educator once you receive Dexcom G6 to pair with your tandem pump.     Come back to see me in 3 months.

## 2019-09-30 NOTE — PROGRESS NOTES
HPI:   Ashley is a 24 yo woman here with her mom for follow up of type 1 diabetes, diagnosed at age 2.  She is here with her dad today.  She reports that she lost her insurance for a while and was not able to come in to see me in almost a year.  She is ready to get back on track.  She has insurance and is quite interested in getting the dexcom G6 sensor so that she can have it integrated with her tandem pump.  She started the tandem pump herself, as she did not have insurance.  She transferred the settings from her medtronic pump.  She rarely tests her sugars, but the last few have been in the 300's.  She is bolusing about 3 times per day, not counting carbs.      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  She has applied for a photography internship with the [employer] and she hopes this works out.      Ashley wears a Tandem pump with basal rates as follows:     Mid- 0.5  9am- 0.6  8pm-  0.5    IC ratio- 1:15g  Sensitivity- 50   Target glucose- 130  Active insulin time- 4 hours    Average total daily insulin dose- 30 Units (41%basal, 59%bolus)    She has occasional abdominal pain, unsure if it is related to foods.  She notes that it is worse when her sugars are higher.  She does have a history of gastroparesis, but she is able to eat most foods.  She has no other concerns.       ROS  GENERAL: weight stable.  No fevers, chills, malaise, night sweats.   HEENT: no dysphagia, diplopia, neck pain or tenderness, dry/scratchy eyes, URI, cough, sinus drainage, tinnitus, sinus pressure  CV: no chest pain, pressure.  +palpitations occasionally.  Feels anxious.   LUNGS: no SOB, CARRASCO, cough, sputum production, wheezing   GI: no diarrhea, constipation, abdominal pain  EXTREMITIES: no rashes, ulcers, edema  NEUROLOGY: no changes in vision, tingling or numbness in hands or feet.   MSK: no muscle aches or pains, weakness  PSYCH: anxiety a bit  better    Past Medical History:   Diagnosis Date     Diabetes (H)     Diagnosed at 18 months old, currently with insulin pump       Past Surgical History:   Procedure Laterality Date     APPENDECTOMY  04/2018       Family History   Problem Relation Age of Onset     Diabetes Mother      Hyperlipidemia Mother      Mental Illness Mother      Diabetes Father      Mental Illness Father      Diabetes Maternal Grandfather      Hypertension Maternal Grandfather      Hyperlipidemia Maternal Grandfather      Breast Cancer Paternal Grandmother      Cerebrovascular Disease Paternal Grandfather      Family History:  Mother-Type 2 DM, high cholesterol.   Maternal uncle- type 1 diabetes  Maternal cousins- type 1  Father-Type 2 DM  Paternal grandmother- breast cancer.   Great grand aunt-DM type 2  Uncles with Type 2 DM   Paternal grandfather- stroke at age 54   No thyroid disease.    Social History     Social History     Marital Status: Single     Spouse Name: N/A     Number of Children: N/A     Years of Education: N/A     Social History Main Topics     Smoking status: Never Smoker      Smokeless tobacco: None     Alcohol Use: No     Drug Use: No     Sexual Activity: Not Asked     Other Topics Concern     None     Social History Narrative   Social Hx: Ashley is single, lives at home with her parents and older brother.  She is very interested in photography and was enrolled at the [employer], but stopped going to classes due to her health issues.   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    Current Outpatient Medications   Medication     blood glucose (MILA CONTOUR NEXT) test strip     blood glucose monitoring (NO BRAND SPECIFIED) test strip     Blood Glucose/Ketone Monitor BRI     escitalopram (LEXAPRO) 20 MG tablet     glucagon 1 MG kit     insulin aspart (NOVOLOG  "VIAL) 100 UNITS/ML injection     insulin lispro (HUMALOG) 100 UNIT/ML injection     Insulin Pump Accessories (SNAP INSULIN PUMP CONTROLLER) MISC     ketone blood test (PRECISION XTRA KETONE) STRP     ketone blood test STRP     MELATONIN PO     Multiple vitamin  s TABS     OMEPRAZOLE PO     ondansetron (ZOFRAN-ODT) 4 MG ODT tab     No current facility-administered medications for this visit.           Allergies   Allergen Reactions     No Clinical Screening - See Comments Hives and Rash     straw       Physical Exam  /80   Pulse 116   Ht 1.558 m (5' 1.34\")   Wt 54.3 kg (119 lb 11.2 oz)   BMI 22.37 kg/m    GENERAL:  Alert and oriented X3, NAD, well dressed, answering questions appropriately, appears stated age.  EXTREMITIES: no edema, +pulses, no rashes, no lesions    RESULTS  Lab Results   Component Value Date    A1C 12.2 (A) 01/11/2016    HEMOGLOBINA1 11.0 (A) 09/30/2019    HEMOGLOBINA1 10.2 (A) 09/10/2018    HEMOGLOBINA1 8.8 (A) 04/16/2018    HEMOGLOBINA1 9.9 (A) 11/13/2017    HEMOGLOBINA1 10.1 (A) 08/07/2017       TSH   Date Value Ref Range Status   10/31/2016 1.63 0.40 - 4.00 mU/L Final       No results found for: ALT]    No results for input(s): CHOL, HDL, LDL, TRIG, CHOLHDLRATIO in the last 19678 hours.    No results found for: NA   No results found for: POTASSIUM  No results found for: CHLORIDE  No results found for: MED  No results found for: CO2  No results found for: BUN    Lab Results   Component Value Date    CR 0.63 10/31/2016       GFR Estimate   Date Value Ref Range Status   10/31/2016 >90  Non  GFR Calc   >60 mL/min/1.7m2 Final     GFR Estimate If Black   Date Value Ref Range Status   10/31/2016 >90   GFR Calc   >60 mL/min/1.7m2 Final       Lab Results   Component Value Date    MICROL <5 10/31/2016     No results found for: MICROALBUMIN  No results found for: CPEPT, GADAB, ISCAB    No results found for: B12]    Most recent eye exam date: : Not Found "     Assessment/Plan:     1.  Type 1 diabetes-  Ashley is struggling with high blood sugars.  A1c is 11.0%, but she has been much lower in the past couple years.  She is ready to get back into taking care of her diabetes again and would really like to start the Dexcom G6 sensor.  I will send the prescription and she will schedule in DM education to start this.  She looks forward to have this integrated with her TAndem pump.  Since I have no data, I will not make changes in her pump settings today, however we agreed on these goals that Ashley set today (instructions given to patient):   Goals:   1.  Test blood sugar once daily (more if you feel motivated!)  2.  Take insulin before eating 3-4 times per day    Schedule with diabetes educator once you receive Dexcom G6 to pair with your tandem pump.     Focus on covering all of your carbs.     2.  Risk factors-     Retinopathy:  No.  Had eye exam within last year. Scheduled for eye exam.   Nephropathy:  BP well controlled, but heart rate high.  Will check TSH and Hgb.  No microalbuminuria. Will recheck.  Creatinine is stable.   Neuropathy: No. +monofilament, +vibratory sensation.    Feet: OK, no ulcers.   Lipids: Need labs.  Will check today.   Abdominal pain- will screen for celiac.      3.  F/U in 3 months with me, 6 months with Dr. Stern, sooner with concerns or hypoglycemia.     I spent 30 minutes with this patient face to face and explained the conditions and plans (more than 50% of time was counseling/coordination of care, diabetes management, follow up plan for worsening hyper and hypoglycemia) . The patient understood and is satisfied with today's visit.       Aiyana Melendez PA-C, MPAS   [facility]  Department of Medicine  Division of Endocrinology and Diabetes

## 2019-09-30 NOTE — LETTER
9/30/2019      RE: Ashley Gonzales  2014 11th [facility] [id number]       HPI:   Ashley is a 22 yo woman here with her mom for follow up of type 1 diabetes, diagnosed at age 2.  She is here with her dad today.  She reports that she lost her insurance for a while and was not able to come in to see me in almost a year.  She is ready to get back on track.  She has insurance and is quite interested in getting the dexcom G6 sensor so that she can have it integrated with her tandem pump.  She started the tandem pump herself, as she did not have insurance.  She transferred the settings from her medtronic pump.  She rarely tests her sugars, but the last few have been in the 300's.  She is bolusing about 3 times per day, not counting carbs.      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  She has applied for a photography internship with [employer] and she hopes this works out.      Ashley wears a Tandem pump with basal rates as follows:     Mid- 0.5  9am- 0.6  8pm-  0.5    IC ratio- 1:15g  Sensitivity- 50   Target glucose- 130  Active insulin time- 4 hours    Average total daily insulin dose- 30 Units (41%basal, 59%bolus)    She has occasional abdominal pain, unsure if it is related to foods.  She notes that it is worse when her sugars are higher.  She does have a history of gastroparesis, but she is able to eat most foods.  She has no other concerns.       ROS  GENERAL: weight stable.  No fevers, chills, malaise, night sweats.   HEENT: no dysphagia, diplopia, neck pain or tenderness, dry/scratchy eyes, URI, cough, sinus drainage, tinnitus, sinus pressure  CV: no chest pain, pressure.  +palpitations occasionally.  Feels anxious.   LUNGS: no SOB, CARRASCO, cough, sputum production, wheezing   GI: no diarrhea, constipation, abdominal pain  EXTREMITIES: no rashes, ulcers, edema  NEUROLOGY: no changes in vision, tingling or numbness in hands or  feet.   MSK: no muscle aches or pains, weakness  PSYCH: anxiety a bit better    Past Medical History:   Diagnosis Date     Diabetes (H)     Diagnosed at 18 months old, currently with insulin pump       Past Surgical History:   Procedure Laterality Date     APPENDECTOMY  04/2018       Family History   Problem Relation Age of Onset     Diabetes Mother      Hyperlipidemia Mother      Mental Illness Mother      Diabetes Father      Mental Illness Father      Diabetes Maternal Grandfather      Hypertension Maternal Grandfather      Hyperlipidemia Maternal Grandfather      Breast Cancer Paternal Grandmother      Cerebrovascular Disease Paternal Grandfather      Family History:  Mother-Type 2 DM, high cholesterol.   Maternal uncle- type 1 diabetes  Maternal cousins- type 1  Father-Type 2 DM  Paternal grandmother- breast cancer.   Great grand aunt-DM type 2  Uncles with Type 2 DM   Paternal grandfather- stroke at age 54   No thyroid disease.    Social History     Social History     Marital Status: Single     Spouse Name: N/A     Number of Children: N/A     Years of Education: N/A     Social History Main Topics     Smoking status: Never Smoker      Smokeless tobacco: None     Alcohol Use: No     Drug Use: No     Sexual Activity: Not Asked     Other Topics Concern     None     Social History Narrative   Social Hx: Ashley is single, lives at home with her parents and older brother.  She is very interested in photography and was enrolled at the [employer], but stopped going to classes due to her health issues.   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    Current Outpatient Medications   Medication     blood glucose (MILA CONTOUR NEXT) test strip     blood glucose monitoring (NO BRAND SPECIFIED) test strip     Blood Glucose/Ketone Monitor BRI     escitalopram  "(LEXAPRO) 20 MG tablet     glucagon 1 MG kit     insulin aspart (NOVOLOG VIAL) 100 UNITS/ML injection     insulin lispro (HUMALOG) 100 UNIT/ML injection     Insulin Pump Accessories (SNAP INSULIN PUMP CONTROLLER) MISC     ketone blood test (PRECISION XTRA KETONE) STRP     ketone blood test STRP     MELATONIN PO     Multiple vitamin  s TABS     OMEPRAZOLE PO     ondansetron (ZOFRAN-ODT) 4 MG ODT tab     No current facility-administered medications for this visit.           Allergies   Allergen Reactions     No Clinical Screening - See Comments Hives and Rash     straw       Physical Exam  /80   Pulse 116   Ht 1.558 m (5' 1.34\")   Wt 54.3 kg (119 lb 11.2 oz)   BMI 22.37 kg/m     GENERAL:  Alert and oriented X3, NAD, well dressed, answering questions appropriately, appears stated age.  EXTREMITIES: no edema, +pulses, no rashes, no lesions    RESULTS  Lab Results   Component Value Date    A1C 12.2 (A) 01/11/2016    HEMOGLOBINA1 11.0 (A) 09/30/2019    HEMOGLOBINA1 10.2 (A) 09/10/2018    HEMOGLOBINA1 8.8 (A) 04/16/2018    HEMOGLOBINA1 9.9 (A) 11/13/2017    HEMOGLOBINA1 10.1 (A) 08/07/2017       TSH   Date Value Ref Range Status   10/31/2016 1.63 0.40 - 4.00 mU/L Final       No results found for: ALT]    No results for input(s): CHOL, HDL, LDL, TRIG, CHOLHDLRATIO in the last 07523 hours.    No results found for: NA   No results found for: POTASSIUM  No results found for: CHLORIDE  No results found for: MED  No results found for: CO2  No results found for: BUN    Lab Results   Component Value Date    CR 0.63 10/31/2016       GFR Estimate   Date Value Ref Range Status   10/31/2016 >90  Non  GFR Calc   >60 mL/min/1.7m2 Final     GFR Estimate If Black   Date Value Ref Range Status   10/31/2016 >90   GFR Calc   >60 mL/min/1.7m2 Final       Lab Results   Component Value Date    MICROL <5 10/31/2016     No results found for: MICROALBUMIN  No results found for: CPEPT, GADAB, ISCAB    No " results found for: B12]    Most recent eye exam date: : Not Found     Assessment/Plan:     1.  Type 1 diabetes-  Ashley is struggling with high blood sugars.  A1c is 11.0%, but she has been much lower in the past couple years.  She is ready to get back into taking care of her diabetes again and would really like to start the Dexcom G6 sensor.  I will send the prescription and she will schedule in DM education to start this.  She looks forward to have this integrated with her TAndem pump.  Since I have no data, I will not make changes in her pump settings today, however we agreed on these goals that Ashley set today (instructions given to patient):   Goals:   1.  Test blood sugar once daily (more if you feel motivated!)  2.  Take insulin before eating 3-4 times per day    Schedule with diabetes educator once you receive Dexcom G6 to pair with your tandem pump.     Focus on covering all of your carbs.     2.  Risk factors-     Retinopathy:  No.  Had eye exam within last year. Scheduled for eye exam.   Nephropathy:  BP well controlled, but heart rate high.  Will check TSH and Hgb.  No microalbuminuria. Will recheck.  Creatinine is stable.   Neuropathy: No. +monofilament, +vibratory sensation.    Feet: OK, no ulcers.   Lipids: Need labs.  Will check today.   Abdominal pain- will screen for celiac.      3.  F/U in 3 months with me, 6 months with Dr. Stern, sooner with concerns or hypoglycemia.     I spent 30 minutes with this patient face to face and explained the conditions and plans (more than 50% of time was counseling/coordination of care, diabetes management, follow up plan for worsening hyper and hypoglycemia) . The patient understood and is satisfied with today's visit.       Aiyana Melendez PA-C, MPAS   [facility]  Department of Medicine  Division of Endocrinology and Diabetes

## 2019-09-30 NOTE — LETTER
9/30/2019       RE: Ashley Gonzales  2014 [address] [facility] [address]     Dear Colleague,    Thank you for referring your patient, Ashley Gonzales, to the [facility] ENDOCRINOLOGY at [facility]. Please see a copy of my visit note below.    HPI:   Ashley is a 22 yo woman here with her mom for follow up of type 1 diabetes, diagnosed at age 2.  She is here with her dad today.  She reports that she lost her insurance for a while and was not able to come in to see me in almost a year.  She is ready to get back on track.  She has insurance and is quite interested in getting the dexcom G6 sensor so that she can have it integrated with her tandem pump.  She started the tandem pump herself, as she did not have insurance.  She transferred the settings from her medtronic pump.  She rarely tests her sugars, but the last few have been in the 300's.  She is bolusing about 3 times per day, not counting carbs.      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  She has applied for a photography internship with [employer] and she hopes this works out.      Ashley wears a Tandem pump with basal rates as follows:     Mid- 0.5  9am- 0.6  8pm-  0.5    IC ratio- 1:15g  Sensitivity- 50   Target glucose- 130  Active insulin time- 4 hours    Average total daily insulin dose- 30 Units (41%basal, 59%bolus)    She has occasional abdominal pain, unsure if it is related to foods.  She notes that it is worse when her sugars are higher.  She does have a history of gastroparesis, but she is able to eat most foods.  She has no other concerns.       ROS  GENERAL: weight stable.  No fevers, chills, malaise, night sweats.   HEENT: no dysphagia, diplopia, neck pain or tenderness, dry/scratchy eyes, URI, cough, sinus drainage, tinnitus, sinus pressure  CV: no chest pain, pressure.  +palpitations occasionally.  Feels anxious.   LUNGS: no  SOB, CARRASCO, cough, sputum production, wheezing   GI: no diarrhea, constipation, abdominal pain  EXTREMITIES: no rashes, ulcers, edema  NEUROLOGY: no changes in vision, tingling or numbness in hands or feet.   MSK: no muscle aches or pains, weakness  PSYCH: anxiety a bit better    Past Medical History:   Diagnosis Date     Diabetes (H)     Diagnosed at 18 months old, currently with insulin pump       Past Surgical History:   Procedure Laterality Date     APPENDECTOMY  04/2018       Family History   Problem Relation Age of Onset     Diabetes Mother      Hyperlipidemia Mother      Mental Illness Mother      Diabetes Father      Mental Illness Father      Diabetes Maternal Grandfather      Hypertension Maternal Grandfather      Hyperlipidemia Maternal Grandfather      Breast Cancer Paternal Grandmother      Cerebrovascular Disease Paternal Grandfather      Family History:  Mother-Type 2 DM, high cholesterol.   Maternal uncle- type 1 diabetes  Maternal cousins- type 1  Father-Type 2 DM  Paternal grandmother- breast cancer.   Great grand aunt-DM type 2  Uncles with Type 2 DM   Paternal grandfather- stroke at age 54   No thyroid disease.    Social History     Social History     Marital Status: Single     Spouse Name: N/A     Number of Children: N/A     Years of Education: N/A     Social History Main Topics     Smoking status: Never Smoker      Smokeless tobacco: None     Alcohol Use: No     Drug Use: No     Sexual Activity: Not Asked     Other Topics Concern     None     Social History Narrative   Social Hx: Ashley is single, lives at home with her parents and older brother.  She is very interested in photography and was enrolled at the [employer], but stopped going to classes due to her health issues.   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    Current  "Outpatient Medications   Medication     blood glucose (MILA CONTOUR NEXT) test strip     blood glucose monitoring (NO BRAND SPECIFIED) test strip     Blood Glucose/Ketone Monitor BRI     escitalopram (LEXAPRO) 20 MG tablet     glucagon 1 MG kit     insulin aspart (NOVOLOG VIAL) 100 UNITS/ML injection     insulin lispro (HUMALOG) 100 UNIT/ML injection     Insulin Pump Accessories (SNAP INSULIN PUMP CONTROLLER) MISC     ketone blood test (PRECISION XTRA KETONE) STRP     ketone blood test STRP     MELATONIN PO     Multiple vitamin  s TABS     OMEPRAZOLE PO     ondansetron (ZOFRAN-ODT) 4 MG ODT tab     No current facility-administered medications for this visit.           Allergies   Allergen Reactions     No Clinical Screening - See Comments Hives and Rash     straw       Physical Exam  /80   Pulse 116   Ht 1.558 m (5' 1.34\")   Wt 54.3 kg (119 lb 11.2 oz)   BMI 22.37 kg/m     GENERAL:  Alert and oriented X3, NAD, well dressed, answering questions appropriately, appears stated age.  EXTREMITIES: no edema, +pulses, no rashes, no lesions    RESULTS  Lab Results   Component Value Date    A1C 12.2 (A) 01/11/2016    HEMOGLOBINA1 11.0 (A) 09/30/2019    HEMOGLOBINA1 10.2 (A) 09/10/2018    HEMOGLOBINA1 8.8 (A) 04/16/2018    HEMOGLOBINA1 9.9 (A) 11/13/2017    HEMOGLOBINA1 10.1 (A) 08/07/2017       TSH   Date Value Ref Range Status   10/31/2016 1.63 0.40 - 4.00 mU/L Final       No results found for: ALT]    No results for input(s): CHOL, HDL, LDL, TRIG, CHOLHDLRATIO in the last 70780 hours.    No results found for: NA   No results found for: POTASSIUM  No results found for: CHLORIDE  No results found for: MED  No results found for: CO2  No results found for: BUN    Lab Results   Component Value Date    CR 0.63 10/31/2016       GFR Estimate   Date Value Ref Range Status   10/31/2016 >90  Non  GFR Calc   >60 mL/min/1.7m2 Final     GFR Estimate If Black   Date Value Ref Range Status   10/31/2016 " >90   GFR Calc   >60 mL/min/1.7m2 Final       Lab Results   Component Value Date    MICROL <5 10/31/2016     No results found for: MICROALBUMIN  No results found for: CPEPT, GADAB, ISCAB    No results found for: B12]    Most recent eye exam date: : Not Found     Assessment/Plan:     1.  Type 1 diabetes-  Ashley is struggling with high blood sugars.  A1c is 11.0%, but she has been much lower in the past couple years.  She is ready to get back into taking care of her diabetes again and would really like to start the Dexcom G6 sensor.  I will send the prescription and she will schedule in DM education to start this.  She looks forward to have this integrated with her TAndem pump.  Since I have no data, I will not make changes in her pump settings today, however we agreed on these goals that Ashley set today (instructions given to patient):   Goals:   1.  Test blood sugar once daily (more if you feel motivated!)  2.  Take insulin before eating 3-4 times per day    Schedule with diabetes educator once you receive Dexcom G6 to pair with your tandem pump.     Focus on covering all of your carbs.     2.  Risk factors-     Retinopathy:  No.  Had eye exam within last year. Scheduled for eye exam.   Nephropathy:  BP well controlled, but heart rate high.  Will check TSH and Hgb.  No microalbuminuria. Will recheck.  Creatinine is stable.   Neuropathy: No. +monofilament, +vibratory sensation.    Feet: OK, no ulcers.   Lipids: Need labs.  Will check today.   Abdominal pain- will screen for celiac.      3.  F/U in 3 months with me, 6 months with Dr. Stern, sooner with concerns or hypoglycemia.     I spent 30 minutes with this patient face to face and explained the conditions and plans (more than 50% of time was counseling/coordination of care, diabetes management, follow up plan for worsening hyper and hypoglycemia) . The patient understood and is satisfied with today's visit.       Aiyana Melendez PA-C, MPAS    [facility]  Department of Medicine  Division of Endocrinology and Diabetes         Again, thank you for allowing me to participate in the care of your patient.      Sincerely,    Aiyana Melendez PA-C

## 2019-10-01 DIAGNOSIS — E10.65 POORLY CONTROLLED TYPE 1 DIABETES MELLITUS (H): ICD-10-CM

## 2019-10-01 LAB
TTG IGA SER-ACNC: <1 U/ML
TTG IGG SER-ACNC: <1 U/ML

## 2019-10-01 RX ORDER — PROCHLORPERAZINE 25 MG/1
1 SUPPOSITORY RECTAL
Qty: 12 EACH | Refills: 3 | Status: SHIPPED | OUTPATIENT
Start: 2019-10-01 | End: 2020-10-26

## 2019-10-01 RX ORDER — PROCHLORPERAZINE 25 MG/1
1 SUPPOSITORY RECTAL
Qty: 1 EACH | Refills: 3 | Status: SHIPPED | OUTPATIENT
Start: 2019-10-01 | End: 2020-10-26

## 2019-10-02 ENCOUNTER — TELEPHONE (OUTPATIENT)
Dept: ENDOCRINOLOGY | Facility: CLINIC | Age: 24
End: 2019-10-02

## 2019-10-02 NOTE — TELEPHONE ENCOUNTER
Prior Authorization Retail Medication Request    Medication/Dose: Dexcom G6 transmitter. Change 1 each every 3 months.     ICD code E10.65     Rationale:  Poorly controlled type 1 diabetes . A1c is 11.0%,     Insurance Name:  [email or web address]   Insurance ID:  [id number]       Pharmacy Information  Name:  "[facility]   Phone:  [phone]      Prior Authorization Retail Medication Request    Medication/Dose: Dexcom G6 sensors. Change 1 each every 10 days.     ICD code E10.65     Rationale:  Poorly controlled type 1 diabetes . A1c is 11.0%,     Insurance Name:  [email or web address]   Insurance ID:  [id number]       Pharmacy Information  Name:  "[facility]   Phone:  [phone]

## 2019-10-02 NOTE — TELEPHONE ENCOUNTER
Central Prior Authorization Team   Phone: [phone]      PA Initiation    Medication: Dexcom -PA initiated  Insurance Company: [employer] [place] - Phone [phone] Fax [phone]  [facility]illing the Rx[facility] - [place], [place] - [address]  Filling Pharmacy Phone: [phone]  Filling Pharmacy Fax:    Start Date: 10/2/2019

## 2019-10-04 NOTE — TELEPHONE ENCOUNTER
Prior Authorization Approval    Authorization Effective Date:  10/3/19  Authorization Expiration Date:  10/2/20  Medication: Dexcom -PA approved  Approved Dose/Quantity:   Reference #: [id number]   Insurance Company: [employer] - Phone [phone] Fax [phone]  Expected CoPay:       CoPay Card Available:      Foundation Assistance Needed:    Which Pharmacy is filling the prescription (Not needed for infusion/clinic administered): [facility] - [place], [place] - [address]  Pharmacy Notified: Yes-spoke to [employer] [employer]. She indicates one of the items was filled at a [facility], but she didn't know why. She will contact patient to see where she wants them filled. I gave her my direct number if she has issues or questions.  Patient Notified: No-Pharmacy will contact

## 2019-10-10 ENCOUNTER — ALLIED HEALTH/NURSE VISIT (OUTPATIENT)
Dept: EDUCATION SERVICES | Facility: CLINIC | Age: 24
End: 2019-10-10
Payer: COMMERCIAL

## 2019-10-10 DIAGNOSIS — E10.65 TYPE 1 DIABETES MELLITUS WITH HYPERGLYCEMIA (H): Primary | ICD-10-CM

## 2019-10-11 NOTE — PROGRESS NOTES
Diabetes Self-Management Education & Support      Diabetes Self-Management Education & Support - Personal CGM Start    SUBJECTIVE/OBJECTIVE  Presents for: Individual review  Accompanied by: Mother  Diabetes education in the past 24mo: No  Focus of Visit: CGM  Diabetes type: Type 1  Diabetes management related comments/concerns: getting cgm set up on her Tandem pump  Transportation concerns: No  Other concerns:: Glasses  Cultural Influences/Ethnic Background:  Not  or     Taking Medications  Diabetes Medication(s)     Diabetic Other       glucagon 1 MG kit    Inject 1 mg into the muscle once for 1 dose INJECT SUBCUTANEOUSLY AS DIRECTED AS NEEDED    Insulin       insulin aspart (NOVOLOG VIAL) 100 UNITS/ML injection    Use as directed in insulin pump up to 45 units daily     insulin lispro (HUMALOG) 100 UNIT/ML injection    Use as directed in insulin pump up to 45 units daily        Healthy Coping     Patient Activation Measure Survey Score:  No flowsheet data found.      ASSESSMENT  Type 1 diabetes, a1c above target    CGM-specific education:   Dexcom sensor: insertion technique, sensor site location and rotation, insulin administration in relation to sensor placement, Dexcom : setting alerts/alarms, Calibration, Use of trends and graphs for pattern management and problem solving, Dosing insulin based on sensor glucose results, Dexcom Mobile barbara and Dexcom Clarity software    Ashley set up the pump herself when she was not insured.  She has been happy with the pump.  She struggles with remembering to bolus.  She also states she is not good at checking her blood sugar.  She feels the sensor will lead to better control.    Reminded that she can correct anytime through her bolus calculator because it will deduct active insulin so she doesn't have to worry about stacking insulin.    Ashley was set up for data sharing on Clarity.    PLAN  See Patient Instructions for co-developed, patient-stated  behavior change goals.  AVS printed and provided to patient today. See Follow-Up section for recommended follow-up.      Time Spent: 60 minutes  Encounter Type: Individual    Any diabetes medication dose changes were made via the CDE Protocol and Collaborative Practice Agreement with the patient's referring provider. A copy of this encounter was shared with the provider.

## 2019-10-29 ENCOUNTER — TELEPHONE (OUTPATIENT)
Dept: ENDOCRINOLOGY | Facility: CLINIC | Age: 24
End: 2019-10-29

## 2019-10-29 NOTE — TELEPHONE ENCOUNTER
Writer logged in and looked at DexCom data.  Left message on mom's cell number to have Ashley upload to T:Connect so we can see what the issue is.  Sadia Willingham RN,CDE

## 2019-10-29 NOTE — TELEPHONE ENCOUNTER
"[facility]    Phone Message    May a detailed message be left on voicemail: yes    Reason for Call: Other: PT's mother is requesting a call back regarding the PT\"s high numbers at night.  Please follow up with the PT or the PT's mother     Action Taken: Message routed to:  [facility] ([facility] luisa  "

## 2019-10-29 NOTE — TELEPHONE ENCOUNTER
Sadia, Can you download her Dexcom G6 to see her bs? She is reporting that when she goes to  bed her  BS are in the 100's then they go  up up up to as high  as  400 in the night. She said you  have access to her dexcom through clarity . Krista Linda, RN on 10/29/2019 at 3:31 PM

## 2019-10-30 NOTE — TELEPHONE ENCOUNTER
Ashley Bronson is set up for data sharing on Clarity.  I looked at it yesterday.  I left a message asking for the pump upload to T:Connect so I can see if she is bolusing.  I asked them to let us know her user name and password for T:Connect so we can log in and look at it.  I have not heard back from them.    I am tied up with cf conference most of the day today but if I hear back I will let you know so you can look at it make some recommendations.  Thanks!  Sadia

## 2019-10-30 NOTE — TELEPHONE ENCOUNTER
----- Message from Krista Linda RN sent at 10/29/2019  5:23 PM CDT -----  Regarding: FW: high BS in  night  When you get it downloaded alert Aiyana so she can view it also.   ----- Message -----  From: Aiyana Melendez PA-C  Sent: 10/29/2019   5:15 PM CDT  To: Krista Linda RN  Subject: RE: high BS in  night                            I'm happy to look at her data, but I need to see her pump upload.  Thanks!  Aiyana  ----- Message -----  From: Krista Linda RN  Sent: 10/29/2019   4:16 PM CDT  To: Aiyana Melendez PA-C  Subject: high BS in  night                                You were emailed her  blood sugars  but not sure what it was from.  Sadia Willingham  contacted them to download something  else. . She is having  HS 's and then they go up in the night as high as 400. Needing changes on basals  PM Krista Linda RN on 10/29/2019 at 4:18 PM

## 2019-11-04 ENCOUNTER — HEALTH MAINTENANCE LETTER (OUTPATIENT)
Age: 24
End: 2019-11-04

## 2019-11-12 DIAGNOSIS — E10.9 DIABETES MELLITUS TYPE 1 (H): ICD-10-CM

## 2019-11-12 NOTE — TELEPHONE ENCOUNTER
insulin aspart (NOVOLOG VIAL) 100 UNITS/ML injection      Last Written Prescription Date:  11-7-18  Last Fill Quantity: 50 ml,   # refills: 3  Last Office Visit : 9-30-19  Future Office visit:  1-6-2020    Routing refill request to provider for review/approval because:  Insulin - refilled per clinic        Kathleen M Doege RN

## 2020-04-14 ENCOUNTER — TELEPHONE (OUTPATIENT)
Dept: EDUCATION SERVICES | Facility: CLINIC | Age: 25
End: 2020-04-14

## 2020-04-14 DIAGNOSIS — E10.65 TYPE 1 DIABETES MELLITUS WITH HYPERGLYCEMIA (H): Primary | ICD-10-CM

## 2020-04-14 RX ORDER — INFUSION SET FOR INSULIN PUMP
1 INFUSION SETS-PARAPHERNALIA MISCELLANEOUS
Qty: 30 EACH | Refills: 3 | Status: SHIPPED | OUTPATIENT
Start: 2020-04-14

## 2020-04-17 ENCOUNTER — TELEPHONE (OUTPATIENT)
Dept: ENDOCRINOLOGY | Facility: CLINIC | Age: 25
End: 2020-04-17

## 2020-04-17 DIAGNOSIS — E10.65 POORLY CONTROLLED TYPE 1 DIABETES MELLITUS (H): ICD-10-CM

## 2020-04-17 NOTE — TELEPHONE ENCOUNTER
MEDICAL    PRIOR AUTHORIZATION REQUIRED - See Reason for Call Comments for specific products. Billing with (K) codes.    INSURANCE: MEDICAID  ID: [id number]          [facility]  [phone]    Route determinations back to [facility] Diabetes pool ([id number])

## 2020-04-20 NOTE — TELEPHONE ENCOUNTER
PA Initiation    Medication: AUTOSOFT INFUSION SET AND T SLIM CARTRIDGE -   Insurance Company: [place] Medicaid ([facility]) - Phone [phone] Fax [phone]  Pharmacy Filling the Rx: [facility] - [place], [place] - [address]  Filling Pharmacy Phone:    Filling Pharmacy Fax:    Start Date: 4/20/2020

## 2020-04-22 ENCOUNTER — TELEPHONE (OUTPATIENT)
Dept: EDUCATION SERVICES | Facility: CLINIC | Age: 25
End: 2020-04-22

## 2020-04-22 NOTE — TELEPHONE ENCOUNTER
Ashley is running out of pump supplies.  PA submitted 4/20/20.  Mom called to insurance company to speed this up.  She was told provider needs to call.  Called the number given [phone].  They have no record of PA submitted and will not initiate the PA over the phone because writer does not have NDC numbers for supplies. Sadia Willingham RN,CDE

## 2020-04-24 NOTE — TELEPHONE ENCOUNTER
A one month supply of [employer] XC and reservoirs were left with the  on the main floor for patient's mom to . Sadia Willingham RN,CDE

## 2020-04-27 NOTE — TELEPHONE ENCOUNTER
Hello,     This is submitted for the incorrect CPT code. This must be submitted for . I called [place] [facility] and confirmed  is not an accepted CPT code.     Thanks,

## 2020-05-20 ENCOUNTER — TELEPHONE (OUTPATIENT)
Dept: EDUCATION SERVICES | Facility: CLINIC | Age: 25
End: 2020-05-20

## 2020-05-20 NOTE — TELEPHONE ENCOUNTER
Phone call to Sadia:  She states they got Ashley's pump supplies last Friday.  They will be switching insurance again 6/1/20.  Sadia will let us know if she needs to use a different supplier. Sadia Willingham RN,CDE

## 2020-05-21 NOTE — TELEPHONE ENCOUNTER
Prior Authorization Approval    Medication: AUTOSOFT INFUSION SET AND T SLIM CARTRIDGE - APPROVED was approved on 5/21/2020  Effective: 4/20/2020 to 5/31/2020  Reference #:    Approved Dose/Quantity:   Insurance Company: [place] [employer] ([facility]) - Phone [phone] Fax [phone]  Expected CoPay:    Pharmacy Filling the Rx: [facility] - [facility] [address]  Pharmacy Notified: Yes  Patient Notified: Comment:  **Instructed pharmacy to notify patient when script is ready to /ship.**

## 2020-06-09 DIAGNOSIS — E10.65 POORLY CONTROLLED TYPE 1 DIABETES MELLITUS (H): ICD-10-CM

## 2020-10-21 DIAGNOSIS — E10.65 POORLY CONTROLLED TYPE 1 DIABETES MELLITUS (H): ICD-10-CM

## 2020-10-25 NOTE — TELEPHONE ENCOUNTER
DEXCOM G6 TRANSMITTER, DEXCOM G6 SENSOR (3 PACK)  Insulin supplies - refilled per clinic  Last Clinic Visit: 9/30/19 recommended 3 month follow up  No visits scheduled

## 2020-10-26 RX ORDER — PROCHLORPERAZINE 25 MG/1
SUPPOSITORY RECTAL
Qty: 3 EACH | Refills: 0 | Status: SHIPPED | OUTPATIENT
Start: 2020-10-26

## 2020-10-26 RX ORDER — PROCHLORPERAZINE 25 MG/1
SUPPOSITORY RECTAL
Qty: 1 EACH | Refills: 0 | Status: SHIPPED | OUTPATIENT
Start: 2020-10-26

## 2020-11-22 ENCOUNTER — HEALTH MAINTENANCE LETTER (OUTPATIENT)
Age: 25
End: 2020-11-22

## 2020-12-03 DIAGNOSIS — E10.9 DIABETES MELLITUS TYPE 1 (H): ICD-10-CM

## 2020-12-04 ENCOUNTER — TELEPHONE (OUTPATIENT)
Dept: ENDOCRINOLOGY | Facility: CLINIC | Age: 25
End: 2020-12-04

## 2020-12-04 NOTE — TELEPHONE ENCOUNTER
----- Message from Nikia Goldman RN sent at 12/3/2020  8:55 PM CST -----  Regarding: appt past due Melendez  Please call to schedule.    Thanks    I do not need any follow up on the scheduling of this appointment unless the patient will no longer be receiving care in our clinic.

## 2020-12-04 NOTE — TELEPHONE ENCOUNTER
NOVOLOG 100UNIT/ML SOLN      Last Written Prescription Date:  11/12/2019  Last Fill Quantity: 50 ml ,   # refills: 3  Last Office Visit : 9/30/2019  Future Office visit:  None    Routing refill request to provider for review/approval because:  Appt, Cr and A1C overdue( 9/30/2019)  Insulin - refilled per clinic  Scheduling has been notified to contact the pt for appointment.

## 2020-12-04 NOTE — TELEPHONE ENCOUNTER
Short Acting Insulin Protocol [id number]/03/2020 08:55 PM   Serum creatinine on file in past 12 months Protocol Details    HgbA1C in past 3 or 6 months     Recent (6 mo) or future (30 days) visit within the authorizing provider's specialty      Lab orders placed. Pt notified to schedule via Rx      PLEASE HELP SCHEDULE THE FOLLOWING APPT(S): Return Appointment    TIME FRAME NEEDED BY: First available.       SCHEDULING COMMENTS (optional): with Aiyana Melendez

## 2021-01-05 DIAGNOSIS — E10.9 DIABETES MELLITUS TYPE 1 (H): ICD-10-CM

## 2021-01-05 NOTE — TELEPHONE ENCOUNTER
Phone call from SadiaAshley's mom, insurance will no longer cover visits to [facility].  Insurance requires that they are seen in a different system.  They have an appt set up with a new provider but it is a few weeks out.  In the meantime Ashley needs insulin.  They are totally out.  Novolog filled x 1 to bridge the gap until that appt. Sadia Willingham RN,CDE

## 2021-05-21 DIAGNOSIS — E10.65 TYPE 1 DIABETES MELLITUS WITH HYPERGLYCEMIA (H): ICD-10-CM

## 2021-05-24 ENCOUNTER — TELEPHONE (OUTPATIENT)
Dept: ENDOCRINOLOGY | Facility: CLINIC | Age: 26
End: 2021-05-24

## 2021-05-24 DIAGNOSIS — E10.65 TYPE 1 DIABETES MELLITUS WITH HYPERGLYCEMIA (H): ICD-10-CM

## 2021-05-24 RX ORDER — INSULIN PUMP CARTRIDGE
CARTRIDGE (EA) SUBCUTANEOUS
Qty: 30 EACH | OUTPATIENT
Start: 2021-05-24

## 2021-05-24 RX ORDER — INFUSION SET FOR INSULIN PUMP
INFUSION SETS-PARAPHERNALIA MISCELLANEOUS
Qty: 30 EACH | OUTPATIENT
Start: 2021-05-24

## 2021-05-24 NOTE — TELEPHONE ENCOUNTER
"AUTOSOFT 90 INF SET 6MM 23\" PINK  Last Written Prescription Date:  4/14/2020  Last Fill Quantity: 30,   # refills: 3  Last Office Visit : 9/30/2019  Future Office visit:  None  Routing refill request to provider for review/approval because:  Not on refill Protocol  Gap in visit Sept 2019??  Refer to Clinic for review     T:SLIM X2 3ML CARTRIDGE  MISC  Last Written Prescription Date:  4/14/2020  Last Fill Quantity: 30,   # refills: 3  Last Office Visit : 9/30/2019  Future Office visit:  None  Routing refill request to provider for review/approval because:  Not on refill Protocol  Gap in visit Sept 2019??  Refer to Clinic for review     Merary La RN  Central Triage Red Flags/Med Refills            "

## 2021-05-24 NOTE — TELEPHONE ENCOUNTER
Ashley is seeing a Ashwin MILLS at [facility] for diabetes needs  . Please decline from routing refills to  [facility]. Krista Linda RN on 5/24/2021 at 10:13 AM

## 2021-05-26 DIAGNOSIS — E10.65 TYPE 1 DIABETES MELLITUS WITH HYPERGLYCEMIA (H): ICD-10-CM

## 2021-05-26 RX ORDER — INFUSION SET FOR INSULIN PUMP
INFUSION SETS-PARAPHERNALIA MISCELLANEOUS
Qty: 30 EACH | Refills: 3 | OUTPATIENT
Start: 2021-05-26

## 2021-05-26 RX ORDER — INSULIN PUMP CARTRIDGE
CARTRIDGE (EA) SUBCUTANEOUS
Qty: 30 EACH | Refills: 0 | OUTPATIENT
Start: 2021-05-26

## 2021-05-26 RX ORDER — INSULIN PUMP CARTRIDGE
CARTRIDGE (EA) SUBCUTANEOUS
Qty: 30 EACH | Refills: 3 | Status: SHIPPED | OUTPATIENT
Start: 2021-05-26

## 2021-05-27 DIAGNOSIS — E10.65 POORLY CONTROLLED TYPE 1 DIABETES MELLITUS (H): ICD-10-CM

## 2021-05-27 NOTE — TELEPHONE ENCOUNTER
9/30/2019  [facility] Endocrinology   Aiyana Melendez PA-C  Endocrinology, Diabetes, and Metabolism     F/U in 3 months with me, 6 months with Dr. Stern, sooner with concerns or hypoglycemia.      Scheduling has been notified to contact the pt for appointment.     lindsey heredia

## 2021-05-29 ENCOUNTER — HEALTH MAINTENANCE LETTER (OUTPATIENT)
Age: 26
End: 2021-05-29

## 2021-09-18 ENCOUNTER — HEALTH MAINTENANCE LETTER (OUTPATIENT)
Age: 26
End: 2021-09-18

## 2022-01-08 ENCOUNTER — HEALTH MAINTENANCE LETTER (OUTPATIENT)
Age: 27
End: 2022-01-08

## 2022-08-20 ENCOUNTER — HEALTH MAINTENANCE LETTER (OUTPATIENT)
Age: 27
End: 2022-08-20

## 2022-11-20 ENCOUNTER — HEALTH MAINTENANCE LETTER (OUTPATIENT)
Age: 27
End: 2022-11-20

## 2023-04-15 ENCOUNTER — HEALTH MAINTENANCE LETTER (OUTPATIENT)
Age: 28
End: 2023-04-15

## 2023-11-25 ENCOUNTER — HEALTH MAINTENANCE LETTER (OUTPATIENT)
Age: 28
End: 2023-11-25